# Patient Record
Sex: FEMALE | Race: WHITE | NOT HISPANIC OR LATINO | Employment: FULL TIME | URBAN - NONMETROPOLITAN AREA
[De-identification: names, ages, dates, MRNs, and addresses within clinical notes are randomized per-mention and may not be internally consistent; named-entity substitution may affect disease eponyms.]

---

## 2024-04-17 ENCOUNTER — APPOINTMENT (OUTPATIENT)
Dept: CT IMAGING | Facility: HOSPITAL | Age: 59
End: 2024-04-17
Payer: COMMERCIAL

## 2024-04-17 ENCOUNTER — APPOINTMENT (OUTPATIENT)
Dept: GENERAL RADIOLOGY | Facility: HOSPITAL | Age: 59
End: 2024-04-17
Payer: COMMERCIAL

## 2024-04-17 ENCOUNTER — HOSPITAL ENCOUNTER (OUTPATIENT)
Facility: HOSPITAL | Age: 59
Setting detail: OBSERVATION
Discharge: HOME | End: 2024-04-18
Attending: EMERGENCY MEDICINE | Admitting: STUDENT IN AN ORGANIZED HEALTH CARE EDUCATION/TRAINING PROGRAM
Payer: COMMERCIAL

## 2024-04-17 DIAGNOSIS — R07.9 CHEST PAIN, UNSPECIFIED TYPE: Primary | ICD-10-CM

## 2024-04-17 DIAGNOSIS — J15.69 PNEUMONIA OF LEFT LOWER LOBE DUE TO OTHER AEROBIC GRAM-NEGATIVE BACTERIA: ICD-10-CM

## 2024-04-17 DIAGNOSIS — A41.9 SEPSIS, DUE TO UNSPECIFIED ORGANISM, UNSPECIFIED WHETHER ACUTE ORGAN DYSFUNCTION PRESENT: ICD-10-CM

## 2024-04-17 PROBLEM — R65.10 SIRS (SYSTEMIC INFLAMMATORY RESPONSE SYNDROME): Status: ACTIVE | Noted: 2024-04-17

## 2024-04-17 LAB
ALBUMIN SERPL-MCNC: 3.9 G/DL (ref 3.5–5.2)
ALBUMIN/GLOB SERPL: 1.4 G/DL
ALP SERPL-CCNC: 70 U/L (ref 39–117)
ALT SERPL W P-5'-P-CCNC: 10 U/L (ref 1–33)
ANION GAP SERPL CALCULATED.3IONS-SCNC: 13.2 MMOL/L (ref 5–15)
AST SERPL-CCNC: 17 U/L (ref 1–32)
B PARAPERT DNA SPEC QL NAA+PROBE: NOT DETECTED
B PERT DNA SPEC QL NAA+PROBE: NOT DETECTED
BACTERIA UR QL AUTO: ABNORMAL /HPF
BASOPHILS # BLD AUTO: 0.1 10*3/MM3 (ref 0–0.2)
BASOPHILS NFR BLD AUTO: 0.3 % (ref 0–1.5)
BILIRUB SERPL-MCNC: 1.1 MG/DL (ref 0–1.2)
BILIRUB UR QL STRIP: ABNORMAL
BUN SERPL-MCNC: 15 MG/DL (ref 8–23)
BUN/CREAT SERPL: 21.7 (ref 7–25)
C PNEUM DNA NPH QL NAA+NON-PROBE: NOT DETECTED
CALCIUM SPEC-SCNC: 9.6 MG/DL (ref 8.6–10.5)
CHLORIDE SERPL-SCNC: 89 MMOL/L (ref 98–107)
CLARITY UR: ABNORMAL
CO2 SERPL-SCNC: 27.8 MMOL/L (ref 22–29)
COLOR UR: ABNORMAL
CREAT SERPL-MCNC: 0.69 MG/DL (ref 0.57–1)
CRP SERPL-MCNC: 34.68 MG/DL (ref 0–0.5)
D-LACTATE SERPL-SCNC: 1.6 MMOL/L (ref 0.5–2)
DEPRECATED RDW RBC AUTO: 41.7 FL (ref 37–54)
EGFRCR SERPLBLD CKD-EPI 2021: 98.9 ML/MIN/1.73
EOSINOPHIL # BLD AUTO: 0.01 10*3/MM3 (ref 0–0.4)
EOSINOPHIL NFR BLD AUTO: 0 % (ref 0.3–6.2)
ERYTHROCYTE [DISTWIDTH] IN BLOOD BY AUTOMATED COUNT: 12.7 % (ref 12.3–15.4)
ERYTHROCYTE [SEDIMENTATION RATE] IN BLOOD: 39 MM/HR (ref 0–30)
FLUAV SUBTYP SPEC NAA+PROBE: NOT DETECTED
FLUBV RNA ISLT QL NAA+PROBE: NOT DETECTED
GEN 5 2HR TROPONIN T REFLEX: 15 NG/L
GLOBULIN UR ELPH-MCNC: 2.7 GM/DL
GLUCOSE SERPL-MCNC: 107 MG/DL (ref 65–99)
GLUCOSE UR STRIP-MCNC: NEGATIVE MG/DL
HADV DNA SPEC NAA+PROBE: NOT DETECTED
HCOV 229E RNA SPEC QL NAA+PROBE: NOT DETECTED
HCOV HKU1 RNA SPEC QL NAA+PROBE: NOT DETECTED
HCOV NL63 RNA SPEC QL NAA+PROBE: NOT DETECTED
HCOV OC43 RNA SPEC QL NAA+PROBE: NOT DETECTED
HCT VFR BLD AUTO: 42.1 % (ref 34–46.6)
HGB BLD-MCNC: 13.8 G/DL (ref 12–15.9)
HGB UR QL STRIP.AUTO: NEGATIVE
HMPV RNA NPH QL NAA+NON-PROBE: DETECTED
HOLD SPECIMEN: NORMAL
HOLD SPECIMEN: NORMAL
HPIV1 RNA ISLT QL NAA+PROBE: NOT DETECTED
HPIV2 RNA SPEC QL NAA+PROBE: NOT DETECTED
HPIV3 RNA NPH QL NAA+PROBE: NOT DETECTED
HPIV4 P GENE NPH QL NAA+PROBE: NOT DETECTED
HYALINE CASTS UR QL AUTO: ABNORMAL /LPF
IMM GRANULOCYTES # BLD AUTO: 0.21 10*3/MM3 (ref 0–0.05)
IMM GRANULOCYTES NFR BLD AUTO: 0.7 % (ref 0–0.5)
KETONES UR QL STRIP: ABNORMAL
LEUKOCYTE ESTERASE UR QL STRIP.AUTO: ABNORMAL
LYMPHOCYTES # BLD AUTO: 1.74 10*3/MM3 (ref 0.7–3.1)
LYMPHOCYTES NFR BLD AUTO: 5.9 % (ref 19.6–45.3)
M PNEUMO IGG SER IA-ACNC: NOT DETECTED
M PNEUMO IGM SER QL: NEGATIVE
MAGNESIUM SERPL-MCNC: 1.9 MG/DL (ref 1.6–2.4)
MCH RBC QN AUTO: 29.7 PG (ref 26.6–33)
MCHC RBC AUTO-ENTMCNC: 32.8 G/DL (ref 31.5–35.7)
MCV RBC AUTO: 90.7 FL (ref 79–97)
MONOCYTES # BLD AUTO: 1.1 10*3/MM3 (ref 0.1–0.9)
MONOCYTES NFR BLD AUTO: 3.8 % (ref 5–12)
NEUTROPHILS NFR BLD AUTO: 26.12 10*3/MM3 (ref 1.7–7)
NEUTROPHILS NFR BLD AUTO: 89.3 % (ref 42.7–76)
NITRITE UR QL STRIP: NEGATIVE
NRBC BLD AUTO-RTO: 0 /100 WBC (ref 0–0.2)
PH UR STRIP.AUTO: 6 [PH] (ref 5–8)
PLATELET # BLD AUTO: 201 10*3/MM3 (ref 140–450)
PMV BLD AUTO: 9.8 FL (ref 6–12)
POTASSIUM SERPL-SCNC: 2.7 MMOL/L (ref 3.5–5.2)
PROT SERPL-MCNC: 6.6 G/DL (ref 6–8.5)
PROT UR QL STRIP: ABNORMAL
QT INTERVAL: 364 MS
QTC INTERVAL: 450 MS
RBC # BLD AUTO: 4.64 10*6/MM3 (ref 3.77–5.28)
RBC # UR STRIP: ABNORMAL /HPF
REF LAB TEST METHOD: ABNORMAL
RHINOVIRUS RNA SPEC NAA+PROBE: NOT DETECTED
RSV RNA NPH QL NAA+NON-PROBE: NOT DETECTED
SARS-COV-2 RNA NPH QL NAA+NON-PROBE: NOT DETECTED
SODIUM SERPL-SCNC: 130 MMOL/L (ref 136–145)
SP GR UR STRIP: 1.02 (ref 1–1.03)
SQUAMOUS #/AREA URNS HPF: ABNORMAL /HPF
TROPONIN T DELTA: -4 NG/L
TROPONIN T SERPL HS-MCNC: 19 NG/L
TSH SERPL DL<=0.05 MIU/L-ACNC: 3.32 UIU/ML (ref 0.27–4.2)
UROBILINOGEN UR QL STRIP: ABNORMAL
WBC # UR STRIP: ABNORMAL /HPF
WBC NRBC COR # BLD AUTO: 29.28 10*3/MM3 (ref 3.4–10.8)
WHOLE BLOOD HOLD COAG: NORMAL
WHOLE BLOOD HOLD SPECIMEN: NORMAL

## 2024-04-17 PROCEDURE — 84484 ASSAY OF TROPONIN QUANT: CPT | Performed by: PHYSICIAN ASSISTANT

## 2024-04-17 PROCEDURE — G0378 HOSPITAL OBSERVATION PER HR: HCPCS

## 2024-04-17 PROCEDURE — 96365 THER/PROPH/DIAG IV INF INIT: CPT

## 2024-04-17 PROCEDURE — 87086 URINE CULTURE/COLONY COUNT: CPT | Performed by: PHYSICIAN ASSISTANT

## 2024-04-17 PROCEDURE — 86140 C-REACTIVE PROTEIN: CPT | Performed by: PHYSICIAN ASSISTANT

## 2024-04-17 PROCEDURE — 71045 X-RAY EXAM CHEST 1 VIEW: CPT

## 2024-04-17 PROCEDURE — 93010 ELECTROCARDIOGRAM REPORT: CPT | Performed by: INTERNAL MEDICINE

## 2024-04-17 PROCEDURE — 74177 CT ABD & PELVIS W/CONTRAST: CPT

## 2024-04-17 PROCEDURE — 94664 DEMO&/EVAL PT USE INHALER: CPT

## 2024-04-17 PROCEDURE — 36415 COLL VENOUS BLD VENIPUNCTURE: CPT

## 2024-04-17 PROCEDURE — 25010000002 AZTREONAM PER 500 MG: Performed by: PHYSICIAN ASSISTANT

## 2024-04-17 PROCEDURE — 71275 CT ANGIOGRAPHY CHEST: CPT

## 2024-04-17 PROCEDURE — 93005 ELECTROCARDIOGRAM TRACING: CPT | Performed by: PHYSICIAN ASSISTANT

## 2024-04-17 PROCEDURE — 83735 ASSAY OF MAGNESIUM: CPT | Performed by: PHYSICIAN ASSISTANT

## 2024-04-17 PROCEDURE — 85652 RBC SED RATE AUTOMATED: CPT | Performed by: PHYSICIAN ASSISTANT

## 2024-04-17 PROCEDURE — 70450 CT HEAD/BRAIN W/O DYE: CPT | Performed by: RADIOLOGY

## 2024-04-17 PROCEDURE — 80053 COMPREHEN METABOLIC PANEL: CPT | Performed by: PHYSICIAN ASSISTANT

## 2024-04-17 PROCEDURE — 25510000001 IOPAMIDOL PER 1 ML: Performed by: EMERGENCY MEDICINE

## 2024-04-17 PROCEDURE — 25010000002 ENOXAPARIN PER 10 MG: Performed by: STUDENT IN AN ORGANIZED HEALTH CARE EDUCATION/TRAINING PROGRAM

## 2024-04-17 PROCEDURE — 85025 COMPLETE CBC W/AUTO DIFF WBC: CPT | Performed by: PHYSICIAN ASSISTANT

## 2024-04-17 PROCEDURE — 96361 HYDRATE IV INFUSION ADD-ON: CPT

## 2024-04-17 PROCEDURE — 87040 BLOOD CULTURE FOR BACTERIA: CPT | Performed by: PHYSICIAN ASSISTANT

## 2024-04-17 PROCEDURE — 25810000003 LACTATED RINGERS PER 1000 ML: Performed by: STUDENT IN AN ORGANIZED HEALTH CARE EDUCATION/TRAINING PROGRAM

## 2024-04-17 PROCEDURE — 74177 CT ABD & PELVIS W/CONTRAST: CPT | Performed by: RADIOLOGY

## 2024-04-17 PROCEDURE — 86738 MYCOPLASMA ANTIBODY: CPT | Performed by: STUDENT IN AN ORGANIZED HEALTH CARE EDUCATION/TRAINING PROGRAM

## 2024-04-17 PROCEDURE — 99223 1ST HOSP IP/OBS HIGH 75: CPT

## 2024-04-17 PROCEDURE — 81001 URINALYSIS AUTO W/SCOPE: CPT | Performed by: PHYSICIAN ASSISTANT

## 2024-04-17 PROCEDURE — 96367 TX/PROPH/DG ADDL SEQ IV INF: CPT

## 2024-04-17 PROCEDURE — 83605 ASSAY OF LACTIC ACID: CPT | Performed by: PHYSICIAN ASSISTANT

## 2024-04-17 PROCEDURE — 99285 EMERGENCY DEPT VISIT HI MDM: CPT

## 2024-04-17 PROCEDURE — 25010000002 VANCOMYCIN 5 G RECONSTITUTED SOLUTION: Performed by: PHYSICIAN ASSISTANT

## 2024-04-17 PROCEDURE — 84443 ASSAY THYROID STIM HORMONE: CPT | Performed by: PHYSICIAN ASSISTANT

## 2024-04-17 PROCEDURE — 94799 UNLISTED PULMONARY SVC/PX: CPT

## 2024-04-17 PROCEDURE — 96372 THER/PROPH/DIAG INJ SC/IM: CPT

## 2024-04-17 PROCEDURE — 0202U NFCT DS 22 TRGT SARS-COV-2: CPT | Performed by: STUDENT IN AN ORGANIZED HEALTH CARE EDUCATION/TRAINING PROGRAM

## 2024-04-17 PROCEDURE — 71045 X-RAY EXAM CHEST 1 VIEW: CPT | Performed by: RADIOLOGY

## 2024-04-17 PROCEDURE — 71275 CT ANGIOGRAPHY CHEST: CPT | Performed by: RADIOLOGY

## 2024-04-17 PROCEDURE — 70450 CT HEAD/BRAIN W/O DYE: CPT

## 2024-04-17 PROCEDURE — 25810000003 SODIUM CHLORIDE 0.9 % SOLUTION: Performed by: PHYSICIAN ASSISTANT

## 2024-04-17 PROCEDURE — 94761 N-INVAS EAR/PLS OXIMETRY MLT: CPT

## 2024-04-17 RX ORDER — SODIUM CHLORIDE 0.9 % (FLUSH) 0.9 %
10 SYRINGE (ML) INJECTION AS NEEDED
Status: DISCONTINUED | OUTPATIENT
Start: 2024-04-17 | End: 2024-04-18 | Stop reason: HOSPADM

## 2024-04-17 RX ORDER — SODIUM CHLORIDE, SODIUM LACTATE, POTASSIUM CHLORIDE, CALCIUM CHLORIDE 600; 310; 30; 20 MG/100ML; MG/100ML; MG/100ML; MG/100ML
100 INJECTION, SOLUTION INTRAVENOUS CONTINUOUS
Status: DISCONTINUED | OUTPATIENT
Start: 2024-04-17 | End: 2024-04-18 | Stop reason: HOSPADM

## 2024-04-17 RX ORDER — IPRATROPIUM BROMIDE AND ALBUTEROL SULFATE 2.5; .5 MG/3ML; MG/3ML
3 SOLUTION RESPIRATORY (INHALATION) EVERY 6 HOURS PRN
Status: DISCONTINUED | OUTPATIENT
Start: 2024-04-17 | End: 2024-04-18 | Stop reason: HOSPADM

## 2024-04-17 RX ORDER — ENOXAPARIN SODIUM 100 MG/ML
40 INJECTION SUBCUTANEOUS DAILY
Status: DISCONTINUED | OUTPATIENT
Start: 2024-04-17 | End: 2024-04-18 | Stop reason: HOSPADM

## 2024-04-17 RX ORDER — DOXYCYCLINE 100 MG/1
100 CAPSULE ORAL DAILY
Status: CANCELLED | OUTPATIENT
Start: 2024-04-17

## 2024-04-17 RX ORDER — LEVOTHYROXINE SODIUM 0.1 MG/1
100 TABLET ORAL
COMMUNITY

## 2024-04-17 RX ORDER — SODIUM CHLORIDE 0.9 % (FLUSH) 0.9 %
10 SYRINGE (ML) INJECTION EVERY 12 HOURS SCHEDULED
Status: DISCONTINUED | OUTPATIENT
Start: 2024-04-17 | End: 2024-04-18 | Stop reason: HOSPADM

## 2024-04-17 RX ORDER — HYDROCHLOROTHIAZIDE 25 MG/1
25 TABLET ORAL EVERY MORNING
Status: CANCELLED | OUTPATIENT
Start: 2024-04-18

## 2024-04-17 RX ORDER — DOXYCYCLINE HYCLATE 100 MG/1
100 CAPSULE ORAL 2 TIMES DAILY
COMMUNITY
End: 2024-04-18 | Stop reason: HOSPADM

## 2024-04-17 RX ORDER — ALBUTEROL SULFATE 90 UG/1
2 AEROSOL, METERED RESPIRATORY (INHALATION) 2 TIMES DAILY PRN
COMMUNITY

## 2024-04-17 RX ORDER — LOSARTAN POTASSIUM 25 MG/1
12.5 TABLET ORAL DAILY
Status: CANCELLED | OUTPATIENT
Start: 2024-04-18

## 2024-04-17 RX ORDER — HYDROCHLOROTHIAZIDE 25 MG/1
25 TABLET ORAL EVERY MORNING
COMMUNITY
End: 2024-04-18 | Stop reason: HOSPADM

## 2024-04-17 RX ORDER — VANCOMYCIN/0.9 % SOD CHLORIDE 1.5G/250ML
20 PLASTIC BAG, INJECTION (ML) INTRAVENOUS ONCE
Status: COMPLETED | OUTPATIENT
Start: 2024-04-17 | End: 2024-04-17

## 2024-04-17 RX ORDER — POTASSIUM CHLORIDE 20 MEQ/1
40 TABLET, EXTENDED RELEASE ORAL EVERY 4 HOURS
Status: COMPLETED | OUTPATIENT
Start: 2024-04-17 | End: 2024-04-18

## 2024-04-17 RX ORDER — SODIUM CHLORIDE 9 MG/ML
40 INJECTION, SOLUTION INTRAVENOUS AS NEEDED
Status: DISCONTINUED | OUTPATIENT
Start: 2024-04-17 | End: 2024-04-18 | Stop reason: HOSPADM

## 2024-04-17 RX ORDER — POTASSIUM CHLORIDE 20 MEQ/1
40 TABLET, EXTENDED RELEASE ORAL ONCE
Status: COMPLETED | OUTPATIENT
Start: 2024-04-17 | End: 2024-04-17

## 2024-04-17 RX ORDER — ASPIRIN 81 MG/1
324 TABLET, CHEWABLE ORAL ONCE
Status: COMPLETED | OUTPATIENT
Start: 2024-04-17 | End: 2024-04-17

## 2024-04-17 RX ORDER — LOSARTAN POTASSIUM 25 MG/1
12.5 TABLET ORAL DAILY
COMMUNITY
End: 2024-04-18 | Stop reason: HOSPADM

## 2024-04-17 RX ADMIN — VANCOMYCIN HYDROCHLORIDE 1500 MG: 5 INJECTION, POWDER, LYOPHILIZED, FOR SOLUTION INTRAVENOUS at 15:11

## 2024-04-17 RX ADMIN — Medication 10 ML: at 20:28

## 2024-04-17 RX ADMIN — ENOXAPARIN SODIUM 40 MG: 40 INJECTION SUBCUTANEOUS at 20:27

## 2024-04-17 RX ADMIN — POTASSIUM CHLORIDE 40 MEQ: 1500 TABLET, EXTENDED RELEASE ORAL at 15:11

## 2024-04-17 RX ADMIN — POTASSIUM CHLORIDE 40 MEQ: 1500 TABLET, EXTENDED RELEASE ORAL at 22:37

## 2024-04-17 RX ADMIN — ASPIRIN 324 MG: 81 TABLET, CHEWABLE ORAL at 12:57

## 2024-04-17 RX ADMIN — AZTREONAM 2 G: 2 INJECTION, POWDER, LYOPHILIZED, FOR SOLUTION INTRAMUSCULAR; INTRAVENOUS at 14:09

## 2024-04-17 RX ADMIN — IOPAMIDOL 100 ML: 755 INJECTION, SOLUTION INTRAVENOUS at 16:44

## 2024-04-17 RX ADMIN — SODIUM CHLORIDE, POTASSIUM CHLORIDE, SODIUM LACTATE AND CALCIUM CHLORIDE 100 ML/HR: 600; 310; 30; 20 INJECTION, SOLUTION INTRAVENOUS at 20:28

## 2024-04-17 NOTE — ED PROVIDER NOTES
Subjective   History of Present Illness  59-year-old female presents secondary to chest pain along with feeling poorly.  Patient states that she has felt poorly on and off since December.  She states that she has been on a few rounds of antibiotics.  She states that she is currently on doxycycline.  She states that she continues to have fevers subjectively on and off with sweats.  She states that she was on some steroids though she finished these last week  She states that in the evening she had an episode of left-sided chest pain that felt like tightness that lasted several hours before it finally resolved spontaneously.  No relieving or exacerbating factors.  No pain with breathing or movement.  No productive cough however she states she has had a loose cough.  She states that she has had sweats at times.  She states that yesterday she was extremely fatigued and slept most of the day.  She has a past medical history of hypertension and thyroid disease.  She presents by private vehicle with .      Review of Systems   Constitutional:  Positive for fatigue and fever.   HENT: Negative.     Respiratory:  Positive for cough.    Cardiovascular: Negative.  Negative for chest pain.   Gastrointestinal: Negative.  Negative for abdominal pain.   Endocrine: Negative.    Genitourinary: Negative.  Negative for dysuria.   Skin: Negative.    Neurological: Negative.    Psychiatric/Behavioral: Negative.     All other systems reviewed and are negative.      History reviewed. No pertinent past medical history.    Allergies   Allergen Reactions    Bactrim [Sulfamethoxazole-Trimethoprim] Hives    Clindamycin/Lincomycin Hives    Penicillins Hives       History reviewed. No pertinent surgical history.    History reviewed. No pertinent family history.    Social History     Socioeconomic History    Marital status:    Tobacco Use    Smoking status: Never    Smokeless tobacco: Never   Vaping Use    Vaping status: Never Used    Substance and Sexual Activity    Alcohol use: Never    Drug use: Never    Sexual activity: Defer           Objective   Physical Exam  Vitals and nursing note reviewed.   Constitutional:       General: She is not in acute distress.     Appearance: She is well-developed. She is not diaphoretic.   HENT:      Head: Normocephalic and atraumatic.      Right Ear: External ear normal.      Left Ear: External ear normal.      Nose: Nose normal.   Eyes:      Conjunctiva/sclera: Conjunctivae normal.      Pupils: Pupils are equal, round, and reactive to light.   Neck:      Vascular: No JVD.      Trachea: No tracheal deviation.   Cardiovascular:      Rate and Rhythm: Normal rate and regular rhythm.      Heart sounds: Normal heart sounds. No murmur heard.  Pulmonary:      Effort: Pulmonary effort is normal. No respiratory distress.      Breath sounds: Normal breath sounds. No wheezing.   Abdominal:      General: Bowel sounds are normal.      Palpations: Abdomen is soft.      Tenderness: There is no abdominal tenderness.   Musculoskeletal:         General: No deformity. Normal range of motion.      Cervical back: Normal range of motion and neck supple.   Skin:     General: Skin is warm and dry.      Coloration: Skin is not pale.      Findings: No erythema or rash.   Neurological:      Mental Status: She is alert and oriented to person, place, and time.      Cranial Nerves: No cranial nerve deficit.   Psychiatric:         Behavior: Behavior normal.         Thought Content: Thought content normal.         Procedures           ED Course  ED Course as of 04/17/24 1817 Wed Apr 17, 2024   1434 ECG 12 Lead Chest Pain  Vent. Rate :  92 BPM     Atrial Rate : 288 BPM     P-R Int :   * ms          QRS Dur :  92 ms      QT Int : 364 ms       P-R-T Axes :   *  59  78 degrees     QTc Int : 450 ms     Atrial flutter with variable AV block  Possible Anterior infarct , age undetermined  Abnormal ECG  No previous ECGs available   [ES]   6124  Reached out to the hospitalist service. [JI]      ED Course User Index  [ES] Danny Heath MD  [JI] Volodymyr Amaro PA                HEART Score: 4                              Medical Decision Making  59-year-old female presents secondary to chest pain along with feeling poorly.  Patient states that she has felt poorly on and off since December.  She states that she has been on a few rounds of antibiotics.  She states that she is currently on doxycycline.  She states that she continues to have fevers subjectively on and off with sweats.  She states that she was on some steroids though she finished these last week  She states that in the evening she had an episode of left-sided chest pain that felt like tightness that lasted several hours before it finally resolved spontaneously.  No relieving or exacerbating factors.  No pain with breathing or movement.  No productive cough however she states she has had a loose cough.  She states that she has had sweats at times.  She states that yesterday she was extremely fatigued and slept most of the day.  She has a past medical history of hypertension and thyroid disease.  She presents by private vehicle with .    Problems Addressed:  Chest pain, unspecified type: complicated acute illness or injury  Sepsis, due to unspecified organism, unspecified whether acute organ dysfunction present: complicated acute illness or injury    Amount and/or Complexity of Data Reviewed  Labs: ordered. Decision-making details documented in ED Course.  Radiology: ordered. Decision-making details documented in ED Course.  ECG/medicine tests: ordered. Decision-making details documented in ED Course.  Discussion of management or test interpretation with external provider(s): Dr Ori Cerrato  OTC drugs.  Prescription drug management.  Decision regarding hospitalization.  Risk Details: Counseled patient about her diagnostic room labs.  She is counseled at the signs and symptoms  worsening appropriate follow-up.  She voices understanding.        Final diagnoses:   Chest pain, unspecified type   Sepsis, due to unspecified organism, unspecified whether acute organ dysfunction present       ED Disposition  ED Disposition       ED Disposition   Decision to Admit    Condition   --    Comment   Level of Care: Med/Surg [1]   Diagnosis: SIRS (systemic inflammatory response syndrome) [773156]                 No follow-up provider specified.       Medication List      No changes were made to your prescriptions during this visit.            Volodymyr Amaro PA  04/17/24 9048

## 2024-04-17 NOTE — H&P
"    [facility] Medicine Services  History & Physical    Patient Identification:  Name:  Iqra López  Age:  59 y.o.  Sex:  female  :  1965  MRN:  [id number]   Visit Number:  [id number]  Admit Date: 2024   Primary Care Physician:  Kitty Kidd APRN    Subjective     Chief complaint: dizziness    History of presenting illness:      Iqra López is a 59 y.o. female who presented for further evaluation of dizziness/pre-syncope, chest pain. She was seen and examined in the ED with spouse at the bedside. They report patient with sudden onset chest pain with felt like a retrosternal pressure 7 days prior to presentation to the ED. She reports her stomach was upset at the time, she states \"probably just gas.\" She also felt dizzy, like she may pass out. She refused to seek medical care at that time. Unfortunately she has not felt well since that time with significant fatigue persisting since that time, stating she just wants to sleep all day. She denies any chest pain since initial episode. No palpitations. She reports subjective fevers throughout the past week as well. She has a cough but states seems to have persisted \"all winter.\" She has been treated with antibiotics and steroids several times. She just finished prednisone course a few days ago and was still finishing antibiotics. She denies previous diagnosis of COPD but does have 50+ year smoking history. Current cough is described as loose but nonproductive. She does report sick contacts at work with a \"stomach bug.\" Beyond her bloating she denies abdominal pain, diarrhea, nausea or vomiting. She denies any wounds or rashes. She denies dysuria but reports decreased urine output. On further discussion she admits her appetite has been diminished as food is not appealing/doesn't taste right.  Of note patient did report frequent night sweats to ED provider as well.    Past medical history is significant for tobacco " abuse    Upon arrival to the ED, vital signs were temp 97.9, heart rate 107, respirations 17, /51, SpO2 95% on RA.  HS troponin elevated on arrival at 19 with repeat at 15.  Sodium is 130 with potassium 2.7.  CRP was 34.68 with WBC count 29.28 and neutrophil percentage 89.3.  Urine was abnormal with moderate leukocytes, 11-20 WBCs and 1+ bacteria though did note 3-6 squamous epis.  Imaging workup noted consolidative lobar pneumonia in the left lower lobe with other multifocal patchy nodularities and groundglass opacities compatible with multifocal infection scattered throughout the lungs bilaterally.  CT of the head was unremarkable.    Known Emergency Department medications received prior to my evaluation included full dose aspirin, aztreonam, potassium chloride, vancomycin.   Emergency Department Room location at the time of my evaluation was 401.     ---------------------------------------------------------------------------------------------------------------------   Review of Systems   Constitutional:  Positive for activity change, appetite change, chills, diaphoresis and fever.   HENT:  Negative for congestion and rhinorrhea.    Respiratory:  Positive for cough. Negative for shortness of breath.    Cardiovascular:  Positive for chest pain. Negative for palpitations.   Gastrointestinal:  Positive for abdominal distention and abdominal pain. Negative for diarrhea, nausea and vomiting.   Genitourinary:  Negative for difficulty urinating and dysuria.   Musculoskeletal:  Negative for arthralgias and myalgias.   Skin:  Negative for rash and wound.   Neurological:  Positive for dizziness and light-headedness. Negative for syncope.        ---------------------------------------------------------------------------------------------------------------------   No past medical history on file.  No past surgical history on file.  No family history on file.  Social History     Socioeconomic History    Marital status:       ---------------------------------------------------------------------------------------------------------------------   Allergies:  Bactrim [sulfamethoxazole-trimethoprim], Clindamycin/lincomycin, and Penicillins  ---------------------------------------------------------------------------------------------------------------------   Home medications:    Medications below are reported home medications pulling from within the system; at this time, these medications have not been reconciled unless otherwise specified and are in the verification process for further verifcation as current home medications.  (Not in a hospital admission)      Hospital Scheduled Meds:          Current listed hospital scheduled medications may not yet reflect those currently placed in orders that are signed and held awaiting patient's arrival to floor.   ---------------------------------------------------------------------------------------------------------------------     Objective     Vital Signs:  Temp:  [97.9 °F (36.6 °C)] 97.9 °F (36.6 °C)  Heart Rate:  [] 93  Resp:  [17] 17  BP: ()/(51-78) 110/68      04/17/24  1221   Weight: 72.1 kg (159 lb)     Body mass index is 26.46 kg/m².  ---------------------------------------------------------------------------------------------------------------------       Physical Exam  Vitals and nursing note reviewed.   Constitutional:       General: She is not in acute distress.  HENT:      Head: Normocephalic and atraumatic.   Eyes:      Extraocular Movements: Extraocular movements intact.      Conjunctiva/sclera: Conjunctivae normal.   Cardiovascular:      Rate and Rhythm: Normal rate and regular rhythm.   Pulmonary:      Effort: Pulmonary effort is normal.      Breath sounds: Rhonchi present.   Abdominal:      Palpations: Abdomen is soft.      Tenderness: There is no abdominal tenderness.   Musculoskeletal:      Right lower leg: No edema.      Left lower leg: No edema.  "  Skin:     General: Skin is warm and dry.   Neurological:      Mental Status: She is alert. Mental status is at baseline.   Psychiatric:         Mood and Affect: Mood normal.         Behavior: Behavior normal.             ---------------------------------------------------------------------------------------------------------------------  EKG:        I have personally looked at the EKG.  ---------------------------------------------------------------------------------------------------------------------   Results from last 7 days   Lab Units 04/17/24  1336 04/17/24  1255   CRP mg/dL  --  34.68*   LACTATE mmol/L 1.6  --    WBC 10*3/mm3  --  29.28*   HEMOGLOBIN g/dL  --  13.8   HEMATOCRIT %  --  42.1   MCV fL  --  90.7   MCHC g/dL  --  32.8   PLATELETS 10*3/mm3  --  201         Results from last 7 days   Lab Units 04/17/24  1344   SODIUM mmol/L 130*   POTASSIUM mmol/L 2.7*   MAGNESIUM mg/dL 1.9   CHLORIDE mmol/L 89*   CO2 mmol/L 27.8   BUN mg/dL 15   CREATININE mg/dL 0.69   CALCIUM mg/dL 9.6   GLUCOSE mg/dL 107*   ALBUMIN g/dL 3.9   BILIRUBIN mg/dL 1.1   ALK PHOS U/L 70   AST (SGOT) U/L 17   ALT (SGPT) U/L 10   Estimated Creatinine Clearance: 85.2 mL/min (by C-G formula based on SCr of 0.69 mg/dL).  No results found for: \"AMMONIA\"  Results from last 7 days   Lab Units 04/17/24  1344   HSTROP T ng/L 19*         No results found for: \"HGBA1C\"  Lab Results   Component Value Date    TSH 3.320 04/17/2024     No results found for: \"PREGTESTUR\", \"PREGSERUM\", \"HCG\", \"HCGQUANT\"  Pain Management Panel           No data to display              No results found for: \"BLOODCX\"  No results found for: \"URINECX\"  No results found for: \"WOUNDCX\"  No results found for: \"STOOLCX\"      ---------------------------------------------------------------------------------------------------------------------  Imaging Results (Last 7 Days)       Procedure Component Value Units Date/Time    CT Abdomen Pelvis With Contrast [[id number]] Resulted: " "04/17/24 1625     Updated: 04/17/24 1625    CT Angiogram Chest Pulmonary Embolism [[id number]] Resulted: 04/17/24 1624     Updated: 04/17/24 1624    XR Chest 1 View [[id number]] Collected: 04/17/24 1312     Updated: 04/17/24 1327    Narrative:      PROCEDURE: XR CHEST 1 VW-       HISTORY: Chest Pain Protocol     COMPARISON: None.     FINDINGS: The heart is normal in size. The mediastinum is unremarkable.  There is blunting of the left costophrenic angle likely a small  effusion. There is left basilar atelectasis. The right lung is clear.  There is no pneumothorax. There are no acute osseous abnormalities.       Impression:      Small left effusion and basilar atelectasis.        This report was finalized on 4/17/2024 1:13 PM by Marques Echeverria M.D..       CT Head Without Contrast [[id number]] Collected: 04/17/24 1317     Updated: 04/17/24 1327    Narrative:      PROCEDURE: CT HEAD WO CONTRAST-     HISTORY: dizziness     COMPARISON:  None .     TECHNIQUE: Multiple axial CT images were performed from the foramen  magnum to the vertex without enhancement. This study was performed with  techniques to keep radiation doses as low as reasonably achievable  (ALARA). Individualized dose reduction techniques using automated  exposure control or adjustment of mA and/or kV according to the patient  size were employed.     FINDINGS: There is no CT evidence of hemorrhage. There is no mass, mass  effect or midline shift.  There is no hydrocephalus. The paranasal  sinuses are clear. Bone windows reveal no acute osseous abnormalities.       Impression:      No acute intracranial process.              This report was finalized on 4/17/2024 1:18 PM by Marques Echeverria M.D..               Cultures:  No results found for: \"BLOODCX\", \"URINECX\", \"WOUNDCX\", \"MRSACX\", \"RESPCX\", \"STOOLCX\"    Last echocardiogram:          I have personally reviewed the above radiology images and read the final radiology report on " 04/17/24  ---------------------------------------------------------------------------------------------------------------------  Assessment / Plan     There are no active hospital problems to display for this patient.      ASSESSMENT/PLAN:    Sepsis suspect secondary to acute bilateral multifocal pneumonia  Abnormal UA, concern for UTI contributing  Chest pain with indeterminately elevated HS troponin, possibly 2/2 #1  Hypokalemia  Chronic tobacco abuse, concern for underlying COPD  Patient presents secondary to 1 week history of fatigue and malaise with subjective fevers and night sweats following initial episode of chest pain/presyncope.  She also notes some abdominal pain/bloating without diarrhea, nausea or vomiting-notes several coworkers are sick with similar stomach bug.  She did meet SIRS/sepsis criteria on arrival with tachycardia and leukocytosis at 29 K.  CRP is elevated at 34.68.  Imaging revealed findings consistent with bilateral multifocal pneumonia with lobar consolidation in the left lower lobe  UA was abnormal with moderate leukocytes, 11-20 WBCs and 1+ bacteria though did note 3-6 squamous epis.  Culture is pending.  Admit to [facility] for further observation and management  Respiratory PCR to further evaluate  Continue aztreonam, pharmacy to dose  Repeat labs in the a.m.  Follow-up culture results  Continue to provide supportive care measures  Encourage smoking cessation    ----------  -DVT prophylaxis: Lovenox  -Activity: Up in chair  -Expected length of stay: Less than 2 midnights  -Disposition pending course    High risk secondary to SIRS/sepsis, likely due to multifocal pneumonia    There are no questions and answers to display.       Doc Marks PA-C   04/17/24  16:44 EDT

## 2024-04-17 NOTE — PLAN OF CARE
Goal Outcome Evaluation:  Plan of Care Reviewed With: patient        Progress: no change  Outcome Evaluation: pt admit from er. assessment and vitals obtained. no other changes to note at this time; poc ongoing

## 2024-04-17 NOTE — CASE MANAGEMENT/SOCIAL WORK
Discharge Planning Assessment   [place]     Patient Name: Iqra Rubio  MRN: [id number]  Today's Date: 4/17/2024    Admit Date: 4/17/2024    Plan: Spoke with patient and spouse at bedside. Patient has no POA or Living Will. Patient uses no DME, HH or Oxygen. Patient PCP Mena Aldana and pharmacy [facility]. Patient denies any needs at this time. Patient spouse will transport home at discharge.   Discharge Needs Assessment       Row Name 04/17/24 1451       Living Environment    People in Home spouse    Name(s) of People in Home VALENTIN RUBIO Spouse [phone]    Potentially Unsafe Housing Conditions none    In the past 12 months has the electric, gas, oil, or water company threatened to shut off services in your home? No    Primary Care Provided by self    Provides Primary Care For no one    Family Caregiver if Needed spouse    Quality of Family Relationships helpful;involved;supportive    Able to Return to Prior Arrangements yes       Resource/Environmental Concerns    Resource/Environmental Concerns none    Transportation Concerns none       Transportation Needs    In the past 12 months, has lack of transportation kept you from medical appointments or from getting medications? no    In the past 12 months, has lack of transportation kept you from meetings, work, or from getting things needed for daily living? No       Food Insecurity    Within the past 12 months, you worried that your food would run out before you got the money to buy more. Never true    Within the past 12 months, the food you bought just didn't last and you didn't have money to get more. Never true       Transition Planning    Patient/Family Anticipates Transition to home with family    Patient/Family Anticipated Services at Transition none    Transportation Anticipated family or friend will provide       Discharge Needs Assessment    Readmission Within the Last 30 Days no previous admission in last 30 days    Equipment Currently Used  at Home none    Concerns to be Addressed discharge planning    Anticipated Changes Related to Illness none    Equipment Needed After Discharge none                   Discharge Plan       Row Name 04/17/24 2651       Plan    Plan Spoke with patient and spouse at bedside. Patient has no POA or Living Will. Patient uses no DME, HH or Oxygen. Patient PCP Mena Aldana and pharmacy [facility] [place]. Patient denies any needs at this time. Patient spouse will transport home at discharge.    Patient/Family in Agreement with Plan yes               Anahy Ulloa

## 2024-04-17 NOTE — ED NOTES
Called [facility] to obtain a med list for the pt. They said they would fax it over at this time.

## 2024-04-18 VITALS
HEART RATE: 78 BPM | OXYGEN SATURATION: 94 % | HEIGHT: 65 IN | RESPIRATION RATE: 18 BRPM | WEIGHT: 162.7 LBS | SYSTOLIC BLOOD PRESSURE: 100 MMHG | BODY MASS INDEX: 27.11 KG/M2 | TEMPERATURE: 98.3 F | DIASTOLIC BLOOD PRESSURE: 58 MMHG

## 2024-04-18 PROBLEM — R65.10 SIRS (SYSTEMIC INFLAMMATORY RESPONSE SYNDROME): Status: RESOLVED | Noted: 2024-04-17 | Resolved: 2024-04-18

## 2024-04-18 LAB
ANION GAP SERPL CALCULATED.3IONS-SCNC: 8.2 MMOL/L (ref 5–15)
BACTERIA SPEC AEROBE CULT: NORMAL
BUN SERPL-MCNC: 12 MG/DL (ref 8–23)
BUN/CREAT SERPL: 25 (ref 7–25)
CALCIUM SPEC-SCNC: 9 MG/DL (ref 8.6–10.5)
CHLORIDE SERPL-SCNC: 98 MMOL/L (ref 98–107)
CO2 SERPL-SCNC: 27.8 MMOL/L (ref 22–29)
CREAT SERPL-MCNC: 0.48 MG/DL (ref 0.57–1)
DEPRECATED RDW RBC AUTO: 43.3 FL (ref 37–54)
EGFRCR SERPLBLD CKD-EPI 2021: 107.9 ML/MIN/1.73
ERYTHROCYTE [DISTWIDTH] IN BLOOD BY AUTOMATED COUNT: 12.5 % (ref 12.3–15.4)
GLUCOSE SERPL-MCNC: 110 MG/DL (ref 65–99)
HCT VFR BLD AUTO: 37.3 % (ref 34–46.6)
HGB BLD-MCNC: 11.8 G/DL (ref 12–15.9)
MCH RBC QN AUTO: 29.7 PG (ref 26.6–33)
MCHC RBC AUTO-ENTMCNC: 31.6 G/DL (ref 31.5–35.7)
MCV RBC AUTO: 94 FL (ref 79–97)
PLATELET # BLD AUTO: 177 10*3/MM3 (ref 140–450)
PMV BLD AUTO: 10.7 FL (ref 6–12)
POTASSIUM SERPL-SCNC: 3.1 MMOL/L (ref 3.5–5.2)
POTASSIUM SERPL-SCNC: 3.9 MMOL/L (ref 3.5–5.2)
PROCALCITONIN SERPL-MCNC: 0.89 NG/ML (ref 0–0.25)
RBC # BLD AUTO: 3.97 10*6/MM3 (ref 3.77–5.28)
SODIUM SERPL-SCNC: 134 MMOL/L (ref 136–145)
WBC NRBC COR # BLD AUTO: 18.83 10*3/MM3 (ref 3.4–10.8)

## 2024-04-18 PROCEDURE — G0378 HOSPITAL OBSERVATION PER HR: HCPCS

## 2024-04-18 PROCEDURE — 25810000003 LACTATED RINGERS PER 1000 ML: Performed by: STUDENT IN AN ORGANIZED HEALTH CARE EDUCATION/TRAINING PROGRAM

## 2024-04-18 PROCEDURE — 85027 COMPLETE CBC AUTOMATED: CPT | Performed by: STUDENT IN AN ORGANIZED HEALTH CARE EDUCATION/TRAINING PROGRAM

## 2024-04-18 PROCEDURE — 94799 UNLISTED PULMONARY SVC/PX: CPT

## 2024-04-18 PROCEDURE — 96361 HYDRATE IV INFUSION ADD-ON: CPT

## 2024-04-18 PROCEDURE — 84145 PROCALCITONIN (PCT): CPT | Performed by: STUDENT IN AN ORGANIZED HEALTH CARE EDUCATION/TRAINING PROGRAM

## 2024-04-18 PROCEDURE — 25010000002 ENOXAPARIN PER 10 MG: Performed by: STUDENT IN AN ORGANIZED HEALTH CARE EDUCATION/TRAINING PROGRAM

## 2024-04-18 PROCEDURE — 99239 HOSP IP/OBS DSCHRG MGMT >30: CPT

## 2024-04-18 PROCEDURE — 80048 BASIC METABOLIC PNL TOTAL CA: CPT | Performed by: STUDENT IN AN ORGANIZED HEALTH CARE EDUCATION/TRAINING PROGRAM

## 2024-04-18 PROCEDURE — 84132 ASSAY OF SERUM POTASSIUM: CPT | Performed by: STUDENT IN AN ORGANIZED HEALTH CARE EDUCATION/TRAINING PROGRAM

## 2024-04-18 PROCEDURE — 96372 THER/PROPH/DIAG INJ SC/IM: CPT

## 2024-04-18 RX ORDER — ALBUTEROL SULFATE 2.5 MG/3ML
2.5 SOLUTION RESPIRATORY (INHALATION) 2 TIMES DAILY PRN
Status: DISCONTINUED | OUTPATIENT
Start: 2024-04-18 | End: 2024-04-18 | Stop reason: HOSPADM

## 2024-04-18 RX ORDER — LEVOFLOXACIN 750 MG/1
750 TABLET, FILM COATED ORAL DAILY
Qty: 5 TABLET | Refills: 0 | Status: SHIPPED | OUTPATIENT
Start: 2024-04-18 | End: 2024-04-23

## 2024-04-18 RX ORDER — LEVOTHYROXINE SODIUM 0.1 MG/1
100 TABLET ORAL
Status: DISCONTINUED | OUTPATIENT
Start: 2024-04-18 | End: 2024-04-18 | Stop reason: HOSPADM

## 2024-04-18 RX ADMIN — POTASSIUM CHLORIDE 40 MEQ: 1500 TABLET, EXTENDED RELEASE ORAL at 01:34

## 2024-04-18 RX ADMIN — ENOXAPARIN SODIUM 40 MG: 40 INJECTION SUBCUTANEOUS at 08:36

## 2024-04-18 RX ADMIN — LEVOTHYROXINE SODIUM 100 MCG: 100 TABLET ORAL at 08:54

## 2024-04-18 RX ADMIN — POTASSIUM CHLORIDE 40 MEQ: 1500 TABLET, EXTENDED RELEASE ORAL at 05:37

## 2024-04-18 RX ADMIN — SODIUM CHLORIDE, POTASSIUM CHLORIDE, SODIUM LACTATE AND CALCIUM CHLORIDE 100 ML/HR: 600; 310; 30; 20 INJECTION, SOLUTION INTRAVENOUS at 05:37

## 2024-04-18 RX ADMIN — Medication 10 ML: at 08:36

## 2024-04-18 NOTE — PLAN OF CARE
Goal Outcome Evaluation:              Outcome Evaluation: Patient has rested in bed this shift, no complaints or request at this time, VSS, K replaced per protocol, Will continue plan of care.

## 2024-04-18 NOTE — DISCHARGE SUMMARY
"    [facility] HOSPITALIST DISCHARGE SUMMARY    Patient Identification:  Name:  Iqra López  Age:  59 y.o.  Sex:  female  :  1965  MRN:  [id number]  Visit Number:  [id number]    Date of Admission: 2024  Date of Discharge:  24     PCP: Kitty Kidd APRN    Discharging Provider: Michael Marks PA-C / Dr. Rehman    Discharge Diagnoses     Discharge Diagnoses:  Sepsis   Bilateral multifocal pneumonia, bacterial  Chest pain with indeterminately elevated HS troponin, suspect 2/2 #2  Human metapneumovirus   Significant tobacco abuse history, concern for underlying COPD not in acute exacerbation  Hypokalemia     Secondary Diagnoses:  Hypertension  hypothyroidism    Needs on follow up:  PCP, pulmonology for PFTs  Consults/Procedures     Consults:   Consults       No orders found for last 30 day(s).            Procedures/Scans Performed:  CXR  CT head wo  CT abdomen and pelvis w contrast  CT PE protocol        History of Presenting Illness     Chief Complaint   Patient presents with    Dizziness       Patient is a 59 y.o. female who presented to [facility] complaining of dizziness.  Please see the admitting history and physical for further details.      Hospital Course     Patient was admitted to [facility] following presentation to [facility] ED on 24 for further evaluation of fatigue, malaise, and dizziness. Symptoms had been present x 1 week and at initial onset she had experienced some chest discomfort  with reported pre-syncopal episode. She noted she has a 50+ year smoking history, never formally diagnosed with COPD, but had been sick \"all winter\" and treated with multiple rounds of doxycycline and steroids but felt that she had never fully returned to baseline. She was not short of breath during exam. She met SIRS/sepsis criteria on arrival to the ED with tachycardia and leukocytosis at 29K. CT of the head was unremarkable but CT PE protocol noted consolidative lobar " pneumonia LLL and multifocal patchy nodularities and ground glass opacities consistent with multifocal infection. Respiratory PCR was positive for human metapneumovirus. Also of note CMP found her to be hypokalemic at 2.7 and hyponatremic at 130. She was started on aztreonam and admitted for further observation.     Patient's lab work was repeated and demonstrated improvement overnight. She was seen and examined the following AM, stable on RA without increased work of breathing and reported she was feeling improved. Tachycardia resolved, no longer meeting sepsis criteria. Electrolytes were replaced per protocol and demonstrated improvement as well. Given patient clinically stable and not requiring supplemental O2 case was discussed with attending physician and agree she is stable for discharge home on this date. Antibiotics transitioned to Levaquin 750 mg QD x 5 days. Smoking history and concern for underlying COPD discussed and she was strongly encouraged not to smoke. It is also felt she will benefit from follow up with pulmonology for outpatient PFTs, referral placed at discharge. Given presentation with hypokalemia and hyponatremia and BP trend low normal during admission patient's antihypertensives have been held at discharge. She was encouraged to continue to monitor BP at home and keep a log. She will be scheduled to follow up with her PCP in 1 week. Recommend BP check and repeat BMP at that time to monitor electrolytes. The above discharge and follow up plan as well as medication changes were discussed with the patient and spouse at bedside and they expressed agreement and understanding.     Discharge Vitals/Physical Examination     Vital Signs:  Temp:  [97.9 °F (36.6 °C)-98.5 °F (36.9 °C)] 98.3 °F (36.8 °C)  Heart Rate:  [] 78  Resp:  [17-20] 18  BP: ()/(51-85) 100/58  Mean Arterial Pressure (Non-Invasive) for the past 24 hrs (Last 3 readings):   Noninvasive MAP (mmHg)   04/17/24 1530 98    04/17/24 1515 84   04/17/24 1500 74     SpO2 Percentage    04/17/24 2300 04/18/24 0300 04/18/24 0714   SpO2: 95% 98% 94%     SpO2:  [94 %-100 %] 94 %  on   ;   Device (Oxygen Therapy): room air    Body mass index is 27.07 kg/m².  Wt Readings from Last 3 Encounters:   04/17/24 73.8 kg (162 lb 11.2 oz)         Physical Exam:  Physical Exam  Vitals and nursing note reviewed.   Constitutional:       General: She is not in acute distress.  HENT:      Head: Normocephalic and atraumatic.   Eyes:      Extraocular Movements: Extraocular movements intact.      Conjunctiva/sclera: Conjunctivae normal.   Cardiovascular:      Rate and Rhythm: Normal rate and regular rhythm.   Pulmonary:      Effort: Pulmonary effort is normal.      Breath sounds: Rhonchi (mild, improved) present.   Abdominal:      Palpations: Abdomen is soft.      Tenderness: There is no abdominal tenderness.   Musculoskeletal:      Right lower leg: No edema.      Left lower leg: No edema.   Skin:     General: Skin is warm and dry.   Neurological:      Mental Status: She is alert. Mental status is at baseline.   Psychiatric:         Mood and Affect: Mood normal.         Behavior: Behavior normal.         Pertinent Laboratory/Radiology Results     Pertinent Laboratory Results:  Results from last 7 days   Lab Units 04/17/24  1603 04/17/24  1344   HSTROP T ng/L 15* 19*           Results from last 7 days   Lab Units 04/18/24  0149 04/17/24  1336 04/17/24  1255   CRP mg/dL  --   --  34.68*   LACTATE mmol/L  --  1.6  --    WBC 10*3/mm3 18.83*  --  29.28*   HEMOGLOBIN g/dL 11.8*  --  13.8   HEMATOCRIT % 37.3  --  42.1   MCV fL 94.0  --  90.7   MCHC g/dL 31.6  --  32.8   PLATELETS 10*3/mm3 177  --  201     Results from last 7 days   Lab Units 04/18/24  1000 04/18/24  0149 04/17/24  1344   SODIUM mmol/L  --  134* 130*   POTASSIUM mmol/L 3.9 3.1* 2.7*   MAGNESIUM mg/dL  --   --  1.9   CHLORIDE mmol/L  --  98 89*   CO2 mmol/L  --  27.8 27.8   BUN mg/dL  --  12 15  "  CREATININE mg/dL  --  0.48* 0.69   CALCIUM mg/dL  --  9.0 9.6   GLUCOSE mg/dL  --  110* 107*   ALBUMIN g/dL  --   --  3.9   BILIRUBIN mg/dL  --   --  1.1   ALK PHOS U/L  --   --  70   AST (SGOT) U/L  --   --  17   ALT (SGPT) U/L  --   --  10   Estimated Creatinine Clearance: 123.8 mL/min (A) (by C-G formula based on SCr of 0.48 mg/dL (L)).  No results found for: \"AMMONIA\"    No results found for: \"HGBA1C\", \"POCGLU\"  No results found for: \"HGBA1C\"  Lab Results   Component Value Date    TSH 3.320 04/17/2024       No results found for: \"BLOODCX\"  No results found for: \"URINECX\"  No results found for: \"WOUNDCX\"  No results found for: \"STOOLCX\"  No results found for: \"RESPCX\"  Pain Management Panel           No data to display                Pertinent Radiology Results:  Imaging Results (All)       Procedure Component Value Units Date/Time    CT Abdomen Pelvis With Contrast [[id number]] Collected: 04/17/24 1707     Updated: 04/17/24 1709    Narrative:      TECHNIQUE: Axial CT images of the abdomen/pelvis were obtained after IV  contrast administration. Images in bone and soft tissue windows were  reviewed. Coronal and sagittal reformatted images were provided. Images  obtained per as low as reasonably achievable (ALARA) protocols.     COMPARISON: None.     CONTRAST DOSE: 100 cc Isovue 370 given intravenously.     FINDINGS:     The liver, spleen, pancreas, adrenal glands, and kidneys are  unremarkable. The gallbladder is present with no biliary ductal  dilatation.     The abdominal aorta is nonaneurysmal. No enlarged abdominal or pelvic  lymph nodes. No abdominal fluid collections or free fluid.     The small and large bowel are normal in caliber with no obstruction or  adjacent inflammation. There is colonic diverticulosis. Status post  appendectomy.     The urinary bladder is grossly unremarkable. The uterus is surgically  absent.     No aggressive osseous lesions. Preserved lumbar vertebral body height.       " Impression:         1. No acute or significant abnormality in the abdomen/pelvis.        To TALK to On Call [phone]     This report was finalized on 4/17/2024 5:07 PM by Terry Nair MD.       CT Angiogram Chest Pulmonary Embolism [[id number]] Collected: 04/17/24 1656     Updated: 04/17/24 1709    Narrative:      TECHNIQUE: Axial CT images of the thorax were obtained after IV contrast  administration.  Images in lung and soft tissue windows were reviewed.  Coronal and sagittal reformatted images were provided. Images obtained  per as low as reasonably achievable (ALARA) protocols.     COMPARISON: None.     CONTRAST DOSE: 100 cc Isovue 370 given intravenously.     FINDINGS:     Lower neck/thyroid: Unremarkable.     Lungs: There is a consolidative opacity throughout the left lower lobe  compatible with lobar pneumonia, in series 4, image 109. There are  multifocal scattered patchy nodularities and ground-glass opacities  elsewhere throughout the lungs, compatible with multifocal infection.     There is at least mild centrilobular emphysema.     Central airway: Unremarkable.     Pleura: There is a trace amount of left pleural fluid.     Thoracic aorta and great vessels: Normal in diameter.     Pulmonary arteries: Unremarkable. No pulmonary embolism.     Heart and pericardium: No coronary arterial calcification. Unremarkable  cardiac morphology and pericardium.     Lymph nodes: No enlarged thoracic lymph nodes. Prominent left hilar  mandy tissue is presumably reactive given extensive pneumonia.     Mediastinum: Unremarkable.     Thoracic spine and chest wall: No thoracic vertebral body compression  deformity or aggressive osseous lesion.     Lines/Tubes/Devices/Hardware: None.       Impression:         1. There is consolidative lobar pneumonia in the left lower lobe, with  other multifocal patchy nodularities and ground-glass opacities,  compatible with multifocal infection scattered throughout the  lungs  bilaterally.     2. No pulmonary embolism.        To TALK to On Call [phone]     This report was finalized on 4/17/2024 5:07 PM by Terry Nair MD.       XR Chest 1 View [[id number]] Collected: 04/17/24 1312     Updated: 04/17/24 1327    Narrative:      PROCEDURE: XR CHEST 1 VW-       HISTORY: Chest Pain Protocol     COMPARISON: None.     FINDINGS: The heart is normal in size. The mediastinum is unremarkable.  There is blunting of the left costophrenic angle likely a small  effusion. There is left basilar atelectasis. The right lung is clear.  There is no pneumothorax. There are no acute osseous abnormalities.       Impression:      Small left effusion and basilar atelectasis.        This report was finalized on 4/17/2024 1:13 PM by Marques Echeverria M.D..       CT Head Without Contrast [[id number]] Collected: 04/17/24 1317     Updated: 04/17/24 1327    Narrative:      PROCEDURE: CT HEAD WO CONTRAST-     HISTORY: dizziness     COMPARISON:  None .     TECHNIQUE: Multiple axial CT images were performed from the foramen  magnum to the vertex without enhancement. This study was performed with  techniques to keep radiation doses as low as reasonably achievable  (ALARA). Individualized dose reduction techniques using automated  exposure control or adjustment of mA and/or kV according to the patient  size were employed.     FINDINGS: There is no CT evidence of hemorrhage. There is no mass, mass  effect or midline shift.  There is no hydrocephalus. The paranasal  sinuses are clear. Bone windows reveal no acute osseous abnormalities.       Impression:      No acute intracranial process.              This report was finalized on 4/17/2024 1:18 PM by Marques Echeverria M.D..               Discharge Disposition/Discharge Medications/Discharge Appointments     Discharge Disposition:   Home or Self Care    Condition at Discharge:  Stable     Discharge Medications:     Your medication list        START  taking these medications        Instructions Last Dose Given Next Dose Due   levoFLOXacin 750 MG tablet  Commonly known as: Levaquin      Take 1 tablet by mouth Daily for 5 days.              CONTINUE taking these medications        Instructions Last Dose Given Next Dose Due   albuterol sulfate  (90 Base) MCG/ACT inhaler  Commonly known as: PROVENTIL HFA;VENTOLIN HFA;PROAIR HFA      Inhale 2 puffs 2 (Two) Times a Day As Needed for Wheezing or Shortness of Air.       levothyroxine 100 MCG tablet  Commonly known as: SYNTHROID, LEVOTHROID      Take 1 tablet by mouth Every Morning.              STOP taking these medications      doxycycline 100 MG capsule  Commonly known as: VIBRAMYCIN        hydroCHLOROthiazide 25 MG tablet        losartan 25 MG tablet  Commonly known as: COZAAR                  Where to Get Your Medications        These medications were sent to [facility] - [place], [place] - [address] - [phone] [facility] [phone]  [address], [facility] [address]      Phone: [phone]   levoFLOXacin 750 MG tablet          Discharge Diet:  Diet Instructions    Regular as tolerated           Discharge Activity:  Activity Instructions    As tolerated             Time spent on this discharge exceeded 30 minutes.

## 2024-04-22 LAB
BACTERIA SPEC AEROBE CULT: NORMAL
BACTERIA SPEC AEROBE CULT: NORMAL

## 2024-05-21 ENCOUNTER — PATIENT ROUNDING (BHMG ONLY) (OUTPATIENT)
Dept: CARDIOLOGY | Facility: CLINIC | Age: 59
End: 2024-05-21
Payer: COMMERCIAL

## 2024-05-21 ENCOUNTER — OFFICE VISIT (OUTPATIENT)
Dept: CARDIOLOGY | Facility: CLINIC | Age: 59
End: 2024-05-21
Payer: COMMERCIAL

## 2024-05-21 VITALS
SYSTOLIC BLOOD PRESSURE: 121 MMHG | OXYGEN SATURATION: 95 % | WEIGHT: 164.6 LBS | DIASTOLIC BLOOD PRESSURE: 78 MMHG | BODY MASS INDEX: 27.42 KG/M2 | HEART RATE: 92 BPM | HEIGHT: 65 IN | RESPIRATION RATE: 16 BRPM

## 2024-05-21 DIAGNOSIS — I48.19 ATRIAL FIBRILLATION, PERSISTENT: Primary | ICD-10-CM

## 2024-05-21 DIAGNOSIS — I48.92 ATRIAL FLUTTER WITH CONTROLLED RESPONSE: ICD-10-CM

## 2024-05-21 DIAGNOSIS — F17.211 CIGARETTE NICOTINE DEPENDENCE IN REMISSION: ICD-10-CM

## 2024-05-21 DIAGNOSIS — R07.2 PRECORDIAL CHEST PAIN: ICD-10-CM

## 2024-05-21 DIAGNOSIS — I10 ESSENTIAL HYPERTENSION: ICD-10-CM

## 2024-05-21 DIAGNOSIS — R00.2 PALPITATIONS: ICD-10-CM

## 2024-05-21 DIAGNOSIS — R06.02 SHORTNESS OF BREATH: ICD-10-CM

## 2024-05-21 PROCEDURE — 93000 ELECTROCARDIOGRAM COMPLETE: CPT | Performed by: SPECIALIST

## 2024-05-21 PROCEDURE — 99204 OFFICE O/P NEW MOD 45 MIN: CPT | Performed by: SPECIALIST

## 2024-05-21 RX ORDER — HYDROCHLOROTHIAZIDE 25 MG/1
25 TABLET ORAL EVERY 24 HOURS
COMMUNITY

## 2024-05-21 NOTE — PROGRESS NOTES
A [employer] message has been sent to the patient for patient rounding for [facility].

## 2024-05-21 NOTE — PROGRESS NOTES
Subjective   Initial consultation, atrial fibrillation  Iqra López is a 59 y.o. female who presents to day for Abnormal ECG, Shortness of Breath (Recent hosp s/p pneumonia), and Med Management (verbal).    CHIEF COMPLIANT  Chief Complaint   Patient presents with    Abnormal ECG    Shortness of Breath     Recent hosp s/p pneumonia    Med Management     verbal       Active Problems:  Problem List Items Addressed This Visit          Cardiac and Vasculature    Atrial fibrillation, persistent - Primary    Relevant Medications    apixaban (ELIQUIS) 5 MG tablet tablet    Other Relevant Orders    ECG 12 Lead (Completed)    Adult Transthoracic Echo Complete w/ Color, Spectral and Contrast if necessary per protocol    Stress Test With Myocardial Perfusion One Day    CBC (No Diff)    Precordial chest pain    Relevant Orders    Adult Transthoracic Echo Complete w/ Color, Spectral and Contrast if necessary per protocol    Stress Test With Myocardial Perfusion One Day    Lipid Panel    Comprehensive Metabolic Panel    Essential hypertension    Relevant Medications    hydroCHLOROthiazide 25 MG tablet    Palpitations    Relevant Orders    Adult Transthoracic Echo Complete w/ Color, Spectral and Contrast if necessary per protocol    Stress Test With Myocardial Perfusion One Day    Atrial flutter with controlled response       Pulmonary and Pneumonias    Shortness of breath    Relevant Orders    Adult Transthoracic Echo Complete w/ Color, Spectral and Contrast if necessary per protocol    Stress Test With Myocardial Perfusion One Day       Tobacco    Cigarette nicotine dependence in remission       HPI  HPI  Patient back last April woke up with chest pain she describes as discomfort and heaviness with palpitation she was seen in the hospital she was found to have pneumonia and septic she is also hypokalemic and she was admitted to the hospital she was in atrial flutter she is discharged since she is doing a little bit better but  she does get palpitations sometimes no more chest discomfort since last visit short of breath class III at the moment, she had swelling but she is starting taking HCTZ the swelling was gone she used to have high blood pressure before the admission to the hospital she was taking HCTZ with losartan but this has been stopped because of the blood pressure was low at the hospital but since then she started the HCTZ again after the swelling she also used to be a heavy smoker for 50 years less than 1 pack/day and she quit back in January no history of diabetes unknown history of hyperlipidemia family wisely her mother had been told that she had heart attack in the past strokes pacemaker  PRIOR MEDS  Current Outpatient Medications on File Prior to Visit   Medication Sig Dispense Refill    albuterol sulfate  (90 Base) MCG/ACT inhaler Inhale 2 puffs 2 (Two) Times a Day As Needed for Wheezing or Shortness of Air.      hydroCHLOROthiazide 25 MG tablet Take 1 tablet by mouth Daily.      levothyroxine (SYNTHROID, LEVOTHROID) 100 MCG tablet Take 1 tablet by mouth Every Morning.       No current facility-administered medications on file prior to visit.       ALLERGIES  Bactrim [sulfamethoxazole-trimethoprim], Clindamycin/lincomycin, and Penicillins    HISTORY  Past Medical History:   Diagnosis Date    Hypertension     Thyroid disease        Social History     Socioeconomic History    Marital status:    Tobacco Use    Smoking status: Former     Types: Cigarettes    Smokeless tobacco: Never    Tobacco comments:     Quit 01/01/2024   Vaping Use    Vaping status: Never Used   Substance and Sexual Activity    Alcohol use: Never    Drug use: Never    Sexual activity: Defer       Family History   Problem Relation Age of Onset    Heart attack Mother        Review of Systems   Respiratory:  Positive for chest tightness and shortness of breath. Negative for apnea, cough, choking, wheezing and stridor.    Cardiovascular:   "Positive for palpitations and leg swelling.       Objective     VITALS: /78 (BP Location: Right arm, Cuff Size: Adult)   Pulse 92   Resp 16   Ht 165.1 cm (65\")   Wt 74.7 kg (164 lb 9.6 oz)   SpO2 95%   BMI 27.39 kg/m²     LABS:   Lab Results (most recent)       None            IMAGING:   CT Abdomen Pelvis With Contrast    Result Date: 4/17/2024   1. No acute or significant abnormality in the abdomen/pelvis.   To TALK to On Call [phone]  This report was finalized on 4/17/2024 5:07 PM by Terry Nair MD.      CT Angiogram Chest Pulmonary Embolism    Result Date: 4/17/2024   1. There is consolidative lobar pneumonia in the left lower lobe, with other multifocal patchy nodularities and ground-glass opacities, compatible with multifocal infection scattered throughout the lungs bilaterally.  2. No pulmonary embolism.   To TALK to On Call [phone]  This report was finalized on 4/17/2024 5:07 PM by Terry Nair MD.      XR Chest 1 View    Result Date: 4/17/2024  Small left effusion and basilar atelectasis.   This report was finalized on 4/17/2024 1:13 PM by Marques Echeverria M.D..      CT Head Without Contrast    Result Date: 4/17/2024  No acute intracranial process.     This report was finalized on 4/17/2024 1:18 PM by Marques Echeverria M.D..        EXAM:  Physical Exam  Vitals reviewed.   Constitutional:       Appearance: She is well-developed.   HENT:      Head: Normocephalic and atraumatic.   Eyes:      Pupils: Pupils are equal, round, and reactive to light.   Neck:      Thyroid: No thyromegaly.      Vascular: No JVD.   Cardiovascular:      Rate and Rhythm: Normal rate and regular rhythm.      Heart sounds: Normal heart sounds. No murmur heard.     No friction rub. No gallop.   Pulmonary:      Effort: Pulmonary effort is normal. No respiratory distress.      Breath sounds: Normal breath sounds. No stridor. No wheezing or rales.   Chest:      Chest wall: No tenderness. "   Musculoskeletal:         General: No tenderness or deformity.      Cervical back: Neck supple.   Skin:     General: Skin is warm and dry.   Neurological:      Mental Status: She is alert and oriented to person, place, and time.      Cranial Nerves: No cranial nerve deficit.      Coordination: Coordination normal.         Procedure     ECG 12 Lead    Date/Time: 5/21/2024 11:16 AM  Performed by: Magdy Collier MD    Authorized by: Magdy Collier MD      EKG, atrial flutter with controlled ventricular sponsor, comparing with the EKG 4/17/2020 T4 patient at the time was in atrial flutter with controlled response       Assessment & Plan     Diagnoses and all orders for this visit:    1. Atrial fibrillation, persistent (Primary)  -     ECG 12 Lead  -     apixaban (ELIQUIS) 5 MG tablet tablet; Take 1 tablet by mouth 2 (Two) Times a Day.  Dispense: 180 tablet; Refill: 1  -     Adult Transthoracic Echo Complete w/ Color, Spectral and Contrast if necessary per protocol; Future  -     Stress Test With Myocardial Perfusion One Day; Future  -     CBC (No Diff); Future    2. Precordial chest pain  -     Adult Transthoracic Echo Complete w/ Color, Spectral and Contrast if necessary per protocol; Future  -     Stress Test With Myocardial Perfusion One Day; Future  -     Lipid Panel; Future  -     Comprehensive Metabolic Panel; Future    3. Shortness of breath  -     Adult Transthoracic Echo Complete w/ Color, Spectral and Contrast if necessary per protocol; Future  -     Stress Test With Myocardial Perfusion One Day; Future    4. Essential hypertension    5. Palpitations  -     Adult Transthoracic Echo Complete w/ Color, Spectral and Contrast if necessary per protocol; Future  -     Stress Test With Myocardial Perfusion One Day; Future    6. Cigarette nicotine dependence in remission    7. Atrial flutter with controlled response    1.  Patient has been in atrial fibrillation at least since her admission with pneumonia back on  4/17/2024 will plan for cardioversion in the interim and will start her on Eliquis 5 mg p.o. twice daily and will plan for cardioversion in 3 to 4 weeks after anticoagulation she is currently rate controlled so we will continue current management  2.  Will get an echocardiac to assess cardiac function wall motion and valve morphology  3.  She had an episode of chest discomfort with atrial flutter I am going to go ahead and proceed with stress testing for assessment of ischemia she has class III dyspnea on exertion she would not be able to walk on the treadmill we will do a pharmacological stress test  4.  I will check a lipid profile for assessment of her cardiac risk  5.  She quit smoking back in January    Return After stress test.                 MEDS ORDERED DURING VISIT:  New Medications Ordered This Visit   Medications    apixaban (ELIQUIS) 5 MG tablet tablet     Sig: Take 1 tablet by mouth 2 (Two) Times a Day.     Dispense:  180 tablet     Refill:  1       As always, Kitty Kidd APRN  I appreciate very much the opportunity to participate in the cardiovascular care of your patients. Please do not hesitate to call me with any questions with regards to Iqra López evaluation and management.         This document has been electronically signed by Magdy Collier MD  May 21, 2024 12:56 EDT    This note is dictated utilizing voice recognition software.

## 2024-05-29 ENCOUNTER — OFFICE VISIT (OUTPATIENT)
Dept: PULMONOLOGY | Facility: CLINIC | Age: 59
End: 2024-05-29
Payer: COMMERCIAL

## 2024-05-29 VITALS
WEIGHT: 166 LBS | BODY MASS INDEX: 27.66 KG/M2 | TEMPERATURE: 98.4 F | HEART RATE: 81 BPM | DIASTOLIC BLOOD PRESSURE: 68 MMHG | HEIGHT: 65 IN | OXYGEN SATURATION: 96 % | SYSTOLIC BLOOD PRESSURE: 118 MMHG

## 2024-05-29 DIAGNOSIS — R05.3 CHRONIC COUGH: ICD-10-CM

## 2024-05-29 DIAGNOSIS — R06.02 SHORTNESS OF BREATH: Primary | ICD-10-CM

## 2024-05-29 DIAGNOSIS — G47.33 OSA (OBSTRUCTIVE SLEEP APNEA): ICD-10-CM

## 2024-05-29 RX ORDER — TIOTROPIUM BROMIDE INHALATION SPRAY 3.12 UG/1
2 SPRAY, METERED RESPIRATORY (INHALATION)
Qty: 1 EACH | Refills: 5 | Status: SHIPPED | OUTPATIENT
Start: 2024-05-29

## 2024-05-29 NOTE — PROGRESS NOTES
Chief Complaint  Pneumonia    Iqra López is a 59 y.o. female who presents today to [facility] for Pneumonia.    HPI:   Patient is a very pleasant 59-year-old female with more than 40 pack years of cigarette smoking.  She was recently hospitalized for hypoxic respiratory failure due to pneumonia.  She failed outpatient antibiotic regimen.    She feels like still not back to her baseline.  Reported shortness of breath after walking short distance.  She also reported unrefreshing sleep, morning headache and nocturnal cough.  Her  has noticed her stop breathing in her sleep.  She denies fever, night sweats, unintentional weight loss, rash, joint stiffness.    She does have history of arrhythmias but recently has not had any palpitation or lightheadedness.        Previous History:   Past Medical History:   Diagnosis Date    Hypertension     Pneumonia     Thyroid disease       Past Surgical History:   Procedure Laterality Date    HYSTERECTOMY      TUBAL ABDOMINAL LIGATION        Social History     Socioeconomic History    Marital status:    Tobacco Use    Smoking status: Former     Current packs/day: 0.00     Average packs/day: 1 pack/day for 50.0 years (50.0 ttl pk-yrs)     Types: Cigarettes     Quit date: 2024     Years since quittin.4     Passive exposure: Past    Smokeless tobacco: Never    Tobacco comments:     Quit 2024   Vaping Use    Vaping status: Every Day    Substances: Nicotine, Flavoring    Devices: Disposable   Substance and Sexual Activity    Alcohol use: Never    Drug use: Never    Sexual activity: Not Currently     Partners: Male     Birth control/protection: Birth control pill, Hysterectomy, Same-sex partner        Current Medications:  Current Outpatient Medications   Medication Sig Dispense Refill    albuterol sulfate  (90 Base) MCG/ACT inhaler Inhale 2 puffs 2 (Two) Times a Day As Needed for Wheezing or  "Shortness of Air.      apixaban (ELIQUIS) 5 MG tablet tablet Take 1 tablet by mouth 2 (Two) Times a Day. 180 tablet 1    hydroCHLOROthiazide 25 MG tablet Take 1 tablet by mouth Daily.      levothyroxine (SYNTHROID, LEVOTHROID) 100 MCG tablet Take 1 tablet by mouth Every Morning.      tiotropium bromide monohydrate (Spiriva Respimat) 2.5 MCG/ACT aerosol solution inhaler Inhale 2 puffs Daily. 1 each 5     No current facility-administered medications for this visit.       Allergies:   Allergies   Allergen Reactions    Bactrim [Sulfamethoxazole-Trimethoprim] Hives    Clindamycin/Lincomycin Hives    Penicillins Hives       Vitals:   /68   Pulse 81   Temp 98.4 °F (36.9 °C)   Ht 165.1 cm (65\")   Wt 75.3 kg (166 lb)   SpO2 96%   BMI 27.62 kg/m²     Estimated body mass index is 27.62 kg/m² as calculated from the following:    Height as of this encounter: 165.1 cm (65\").    Weight as of this encounter: 75.3 kg (166 lb).             Physical Exam:   Physical Exam  Vitals reviewed.   Constitutional:       Appearance: Normal appearance. She is obese.      Interventions: She is not intubated.  HENT:      Head: Normocephalic.      Nose: Nose normal.      Mouth/Throat:      Mouth: Mucous membranes are moist.      Pharynx: Posterior oropharyngeal erythema (Moderate oropharyngeal crowding) present.   Eyes:      Extraocular Movements: Extraocular movements intact.      Conjunctiva/sclera: Conjunctivae normal.      Pupils: Pupils are equal, round, and reactive to light.   Cardiovascular:      Rate and Rhythm: Normal rate.      Heart sounds: No murmur heard.     No friction rub. No gallop.   Pulmonary:      Effort: Pulmonary effort is normal. No tachypnea, bradypnea, accessory muscle usage, prolonged expiration, respiratory distress or retractions. She is not intubated.      Breath sounds: No stridor, decreased air movement or transmitted upper airway sounds. Wheezing (Distant breath sound, very subtle occasional wheezing " "only on forced exhalation) present.   Abdominal:      General: There is no distension.      Palpations: Abdomen is soft. There is no mass.      Tenderness: There is no abdominal tenderness. There is no right CVA tenderness, left CVA tenderness, guarding or rebound.      Hernia: No hernia is present.   Skin:     Coloration: Skin is not jaundiced.      Findings: No erythema.   Neurological:      General: No focal deficit present.      Mental Status: She is alert and oriented to person, place, and time.   Psychiatric:         Mood and Affect: Mood normal.          Procedures     STOP-Bang Score: STOP-Bang Score  Have you been diagnosed with Sleep Apnea?: no  Snoring?: yes  Tired?: yes  Observed?: no  Pressure?: yes  Stop Score: 3  Body Mass Index more than 35 kg/m2?: yes  Age older than 50 year old?: yes  Neck large? \">17\"/43cm-M, >16\"/41cm-F: no  Gender=Male?: no  Total Stop-Bang Score: 5   [place] Sleepiness Scale: [place] Sleepiness Scale  Sitting and reading: Would never doze  Watching TV: High chance of dozing  Sitting, inactive in a public place (e.g. a theatre or a meeting): Would never doze  As a passenger in a car for an hour without a break: High chance of dozing  Lying down to rest in the afternoon when circumstances permit: Would never doze  Sitting and talking to someone: Would never doze  Sitting quietly after a lunch without alcohol: Would never doze  In a car, while stopped for a few minutes in traffic: Would never doze  Total score: 6     Lab Results:   Admission on 04/17/2024, Discharged on 04/18/2024   Component Date Value Ref Range Status    QT Interval 04/17/2024 364  ms Final    QTC Interval 04/17/2024 450  ms Final    Glucose 04/17/2024 107 (H)  65 - 99 mg/dL Final    BUN 04/17/2024 15  8 - 23 mg/dL Final    Creatinine 04/17/2024 0.69  0.57 - 1.00 mg/dL Final    Sodium 04/17/2024 130 (L)  136 - 145 mmol/L Final    Potassium 04/17/2024 2.7 (L)  3.5 - 5.2 mmol/L Final    Slight hemolysis detected " by analyzer. Result may be falsely elevated.    Chloride 04/17/2024 89 (L)  98 - 107 mmol/L Final    CO2 04/17/2024 27.8  22.0 - 29.0 mmol/L Final    Calcium 04/17/2024 9.6  8.6 - 10.5 mg/dL Final    Total Protein 04/17/2024 6.6  6.0 - 8.5 g/dL Final    Albumin 04/17/2024 3.9  3.5 - 5.2 g/dL Final    ALT (SGPT) 04/17/2024 10  1 - 33 U/L Final    AST (SGOT) 04/17/2024 17  1 - 32 U/L Final    Alkaline Phosphatase 04/17/2024 70  39 - 117 U/L Final    Total Bilirubin 04/17/2024 1.1  0.0 - 1.2 mg/dL Final    Globulin 04/17/2024 2.7  gm/dL Final    A/G Ratio 04/17/2024 1.4  g/dL Final    BUN/Creatinine Ratio 04/17/2024 21.7  7.0 - 25.0 Final    Anion Gap 04/17/2024 13.2  5.0 - 15.0 mmol/L Final    eGFR 04/17/2024 98.9  >60.0 mL/min/1.73 Final    HS Troponin T 04/17/2024 19 (H)  <14 ng/L Final    Magnesium 04/17/2024 1.9  1.6 - 2.4 mg/dL Final    TSH 04/17/2024 3.320  0.270 - 4.200 uIU/mL Final    Extra Tube 04/17/2024 Hold for add-ons.   Final    Auto resulted.    Extra Tube 04/17/2024 hold for add-on   Final    Auto resulted    Extra Tube 04/17/2024 Hold for add-ons.   Final    Auto resulted.    Extra Tube 04/17/2024 Hold for add-ons.   Final    Auto resulted    WBC 04/17/2024 29.28 (H)  3.40 - 10.80 10*3/mm3 Final    RBC 04/17/2024 4.64  3.77 - 5.28 10*6/mm3 Final    Hemoglobin 04/17/2024 13.8  12.0 - 15.9 g/dL Final    Hematocrit 04/17/2024 42.1  34.0 - 46.6 % Final    MCV 04/17/2024 90.7  79.0 - 97.0 fL Final    MCH 04/17/2024 29.7  26.6 - 33.0 pg Final    MCHC 04/17/2024 32.8  31.5 - 35.7 g/dL Final    RDW 04/17/2024 12.7  12.3 - 15.4 % Final    RDW-SD 04/17/2024 41.7  37.0 - 54.0 fl Final    MPV 04/17/2024 9.8  6.0 - 12.0 fL Final    Platelets 04/17/2024 201  140 - 450 10*3/mm3 Final    Neutrophil % 04/17/2024 89.3 (H)  42.7 - 76.0 % Final    Lymphocyte % 04/17/2024 5.9 (L)  19.6 - 45.3 % Final    Monocyte % 04/17/2024 3.8 (L)  5.0 - 12.0 % Final    Eosinophil % 04/17/2024 0.0 (L)  0.3 - 6.2 % Final    Basophil %  04/17/2024 0.3  0.0 - 1.5 % Final    Immature Grans % 04/17/2024 0.7 (H)  0.0 - 0.5 % Final    Neutrophils, Absolute 04/17/2024 26.12 (H)  1.70 - 7.00 10*3/mm3 Final    Lymphocytes, Absolute 04/17/2024 1.74  0.70 - 3.10 10*3/mm3 Final    Monocytes, Absolute 04/17/2024 1.10 (H)  0.10 - 0.90 10*3/mm3 Final    Eosinophils, Absolute 04/17/2024 0.01  0.00 - 0.40 10*3/mm3 Final    Basophils, Absolute 04/17/2024 0.10  0.00 - 0.20 10*3/mm3 Final    Immature Grans, Absolute 04/17/2024 0.21 (H)  0.00 - 0.05 10*3/mm3 Final    nRBC 04/17/2024 0.0  0.0 - 0.2 /100 WBC Final    Blood Culture 04/17/2024 No growth at 5 days   Final    Blood Culture 04/17/2024 No growth at 5 days   Final    Lactate 04/17/2024 1.6  0.5 - 2.0 mmol/L Final    Color, UA 04/17/2024 Orange (A)  Yellow, Straw Final    Appearance, UA 04/17/2024 Cloudy (A)  Clear Final    pH, UA 04/17/2024 6.0  5.0 - 8.0 Final    Specific Gravity, UA 04/17/2024 1.023  1.005 - 1.030 Final    Glucose, UA 04/17/2024 Negative  Negative Final    Ketones, UA 04/17/2024 15 mg/dL (1+) (A)  Negative Final    Bilirubin, UA 04/17/2024 Small (1+) (A)  Negative Final    Blood, UA 04/17/2024 Negative  Negative Final    Protein, UA 04/17/2024 30 mg/dL (1+) (A)  Negative Final    Leuk Esterase, UA 04/17/2024 Moderate (2+) (A)  Negative Final    Nitrite, UA 04/17/2024 Negative  Negative Final    Urobilinogen, UA 04/17/2024 1.0 E.U./dL  0.2 - 1.0 E.U./dL Final    C-Reactive Protein 04/17/2024 34.68 (H)  0.00 - 0.50 mg/dL Final    Sed Rate 04/17/2024 39 (H)  0 - 30 mm/hr Final    RBC, UA 04/17/2024 None Seen  None Seen, 0-2 /HPF Final    WBC, UA 04/17/2024 11-20 (A)  None Seen, 0-2 /HPF Final    Bacteria, UA 04/17/2024 1+ (A)  None Seen /HPF Final    Squamous Epithelial Cells, UA 04/17/2024 3-6 (A)  None Seen, 0-2 /HPF Final    Hyaline Casts, UA 04/17/2024 None Seen  None Seen /LPF Final    Methodology 04/17/2024 Manual Light Microscopy   Final    HS Troponin T 04/17/2024 15 (H)  <14 ng/L Final     Troponin T Delta 04/17/2024 -4 (L)  >=-4 - <+4 ng/L Final    Urine Culture 04/17/2024 50,000 CFU/mL Normal Urogenital Laina   Final    ADENOVIRUS, PCR 04/17/2024 Not Detected  Not Detected Final    Coronavirus 229E 04/17/2024 Not Detected  Not Detected Final    Coronavirus HKU1 04/17/2024 Not Detected  Not Detected Final    Coronavirus NL63 04/17/2024 Not Detected  Not Detected Final    Coronavirus OC43 04/17/2024 Not Detected  Not Detected Final    COVID19 04/17/2024 Not Detected  Not Detected - Ref. Range Final    Human Metapneumovirus 04/17/2024 Detected (A)  Not Detected Final    Human Rhinovirus/Enterovirus 04/17/2024 Not Detected  Not Detected Final    Influenza A PCR 04/17/2024 Not Detected  Not Detected Final    Influenza B PCR 04/17/2024 Not Detected  Not Detected Final    Parainfluenza Virus 1 04/17/2024 Not Detected  Not Detected Final    Parainfluenza Virus 2 04/17/2024 Not Detected  Not Detected Final    Parainfluenza Virus 3 04/17/2024 Not Detected  Not Detected Final    Parainfluenza Virus 4 04/17/2024 Not Detected  Not Detected Final    RSV, PCR 04/17/2024 Not Detected  Not Detected Final    Bordetella pertussis pcr 04/17/2024 Not Detected  Not Detected Final    Bordetella parapertussis PCR 04/17/2024 Not Detected  Not Detected Final    Chlamydophila pneumoniae PCR 04/17/2024 Not Detected  Not Detected Final    Mycoplasma pneumo by PCR 04/17/2024 Not Detected  Not Detected Final    Mycoplasma pneumo IgM 04/17/2024 Negative  Negative Final    Glucose 04/18/2024 110 (H)  65 - 99 mg/dL Final    BUN 04/18/2024 12  8 - 23 mg/dL Final    Creatinine 04/18/2024 0.48 (L)  0.57 - 1.00 mg/dL Final    Sodium 04/18/2024 134 (L)  136 - 145 mmol/L Final    Potassium 04/18/2024 3.1 (L)  3.5 - 5.2 mmol/L Final    Chloride 04/18/2024 98  98 - 107 mmol/L Final    CO2 04/18/2024 27.8  22.0 - 29.0 mmol/L Final    Calcium 04/18/2024 9.0  8.6 - 10.5 mg/dL Final    BUN/Creatinine Ratio 04/18/2024 25.0  7.0 - 25.0 Final  "   Anion Gap 04/18/2024 8.2  5.0 - 15.0 mmol/L Final    eGFR 04/18/2024 107.9  >60.0 mL/min/1.73 Final    WBC 04/18/2024 18.83 (H)  3.40 - 10.80 10*3/mm3 Final    RBC 04/18/2024 3.97  3.77 - 5.28 10*6/mm3 Final    Hemoglobin 04/18/2024 11.8 (L)  12.0 - 15.9 g/dL Final    Hematocrit 04/18/2024 37.3  34.0 - 46.6 % Final    MCV 04/18/2024 94.0  79.0 - 97.0 fL Final    MCH 04/18/2024 29.7  26.6 - 33.0 pg Final    MCHC 04/18/2024 31.6  31.5 - 35.7 g/dL Final    RDW 04/18/2024 12.5  12.3 - 15.4 % Final    RDW-SD 04/18/2024 43.3  37.0 - 54.0 fl Final    MPV 04/18/2024 10.7  6.0 - 12.0 fL Final    Platelets 04/18/2024 177  140 - 450 10*3/mm3 Final    Procalcitonin 04/18/2024 0.89 (H)  0.00 - 0.25 ng/mL Final    Potassium 04/18/2024 3.9  3.5 - 5.2 mmol/L Final       Lab Results (last 72 hours)       ** No results found for the last 72 hours. **          WBC No results found for: \"WBC\"   HGB No results found for: \"HGB\"   HCT No results found for: \"HCT\"   Platlets No results found for: \"LABPLAT\"     PT/INR:  No results found for: \"PROTIME\"/No results found for: \"INR\"    Sodium No results found for: \"NA\"   Potassium No results found for: \"K\"   Chloride No results found for: \"CL\"   Bicarbonate No results found for: \"PLASMABICARB\"   BUN No results found for: \"BUN\"   Creatinine No results found for: \"CREATININE\"   Calcium No results found for: \"CALCIUM\"   Magnesium No results found for: \"MG\"     pH No results found for: \"PHART\"   pO2 No results found for: \"PO2ART\"   pCO2 No results found for: \"SNQ6IBW\"   HCO3 No results found for: \"TGB7OJD\"           Radiology Review:   Results for orders placed during the hospital encounter of 04/17/24    XR Chest 1 View    Narrative  PROCEDURE: XR CHEST 1 VW-    HISTORY: Chest Pain Protocol    COMPARISON: None.    FINDINGS: The heart is normal in size. The mediastinum is unremarkable.  There is blunting of the left costophrenic angle likely a small  effusion. There is left basilar atelectasis. " The right lung is clear.  There is no pneumothorax. There are no acute osseous abnormalities.    Impression  Small left effusion and basilar atelectasis.      This report was finalized on 4/17/2024 1:13 PM by Marques Echeverria M.D..     No results found for this or any previous visit.    No results found for this or any previous visit.    No results found for this or any previous visit.    No results found for this or any previous visit.    No results found for this or any previous visit.     No results found for this or any previous visit.    No results found for this or any previous visit.    No results found for this or any previous visit.                  Results Review: I reviewed the patient's new clinical results.    Assessment and Plan    Visit Diagnosis:     ICD-10-CM ICD-9-CM   1. Shortness of breath  R06.02 786.05   2. Chronic cough  R05.3 786.2   3. LUZ MARINA (obstructive sleep apnea)  G47.33 327.23   Patient with more than 50 pack years of smoking, quit 6 months ago has chronic cough and shortness of breath.  Suspect stage II COPD.  I would like to get a home oxygen evaluation, pulmonary function test.    High clinical suspicion of obstructive sleep apnea given history of observed apnea.  Will get a home sleep study.    Patient does have nocturnal cough and wheezing.  I will add Spiriva Respimat 2.5 mcg 2 puffs every morning.    Continue albuterol 2 puffs every 4 hours as needed.    Reassess in 6 weeks, earlier as needed    New Medications:   New Medications Ordered This Visit   Medications    tiotropium bromide monohydrate (Spiriva Respimat) 2.5 MCG/ACT aerosol solution inhaler     Sig: Inhale 2 puffs Daily.     Dispense:  1 each     Refill:  5       Discontinued Medications:   There are no discontinued medications.         Follow Up:       Patient was given instructions and counseling regarding her condition. Please see specific information pulled into the AVS if appropriate.       This document has been  electronically signed by Og Carrillo MD   May 29, 2024 15:01 EDT    Dictated Utilizing Dragon Dictation: Part of this note may be an electronic transcription/translation of spoken language to printed text using the Dragon Dictation System.

## 2024-06-28 ENCOUNTER — HOSPITAL ENCOUNTER (OUTPATIENT)
Dept: NUCLEAR MEDICINE | Facility: HOSPITAL | Age: 59
Discharge: HOME | End: 2024-06-28
Payer: COMMERCIAL

## 2024-06-28 ENCOUNTER — LAB (OUTPATIENT)
Dept: LAB | Facility: HOSPITAL | Age: 59
End: 2024-06-28
Payer: COMMERCIAL

## 2024-06-28 ENCOUNTER — HOSPITAL ENCOUNTER (OUTPATIENT)
Dept: CARDIOLOGY | Facility: HOSPITAL | Age: 59
Discharge: HOME | End: 2024-06-28
Payer: COMMERCIAL

## 2024-06-28 DIAGNOSIS — R07.2 PRECORDIAL CHEST PAIN: ICD-10-CM

## 2024-06-28 DIAGNOSIS — R00.2 PALPITATIONS: ICD-10-CM

## 2024-06-28 DIAGNOSIS — R06.02 SHORTNESS OF BREATH: ICD-10-CM

## 2024-06-28 DIAGNOSIS — I48.19 ATRIAL FIBRILLATION, PERSISTENT: ICD-10-CM

## 2024-06-28 LAB
ALBUMIN SERPL-MCNC: 4.6 G/DL (ref 3.5–5.2)
ALBUMIN/GLOB SERPL: 1.8 G/DL
ALP SERPL-CCNC: 75 U/L (ref 39–117)
ALT SERPL W P-5'-P-CCNC: 16 U/L (ref 1–33)
ANION GAP SERPL CALCULATED.3IONS-SCNC: 12.6 MMOL/L (ref 5–15)
AST SERPL-CCNC: 22 U/L (ref 1–32)
BH CV ECHO MEAS - AO MAX PG: 5.4 MMHG
BH CV ECHO MEAS - AO MEAN PG: 3 MMHG
BH CV ECHO MEAS - AO ROOT DIAM: 3.1 CM
BH CV ECHO MEAS - AO V2 MAX: 116 CM/SEC
BH CV ECHO MEAS - AO V2 VTI: 24.3 CM
BH CV ECHO MEAS - EDV(CUBED): 54.9 ML
BH CV ECHO MEAS - EDV(MOD-SP4): 24.5 ML
BH CV ECHO MEAS - EF(MOD-SP4): 73 %
BH CV ECHO MEAS - ESV(CUBED): 24.4 ML
BH CV ECHO MEAS - ESV(MOD-SP4): 6.6 ML
BH CV ECHO MEAS - FS: 23.7 %
BH CV ECHO MEAS - IVS/LVPW: 0.86 CM
BH CV ECHO MEAS - IVSD: 1.2 CM
BH CV ECHO MEAS - LA DIMENSION: 3.5 CM
BH CV ECHO MEAS - LAT PEAK E' VEL: 9.9 CM/SEC
BH CV ECHO MEAS - LV DIASTOLIC VOL/BSA (35-75): 13.4 CM2
BH CV ECHO MEAS - LV MASS(C)D: 173.1 GRAMS
BH CV ECHO MEAS - LV SYSTOLIC VOL/BSA (12-30): 3.6 CM2
BH CV ECHO MEAS - LVIDD: 3.8 CM
BH CV ECHO MEAS - LVIDS: 2.9 CM
BH CV ECHO MEAS - LVOT AREA: 3.8 CM2
BH CV ECHO MEAS - LVOT DIAM: 2.2 CM
BH CV ECHO MEAS - LVPWD: 1.4 CM
BH CV ECHO MEAS - MED PEAK E' VEL: 9.5 CM/SEC
BH CV ECHO MEAS - MV A MAX VEL: 64.5 CM/SEC
BH CV ECHO MEAS - MV E MAX VEL: 110 CM/SEC
BH CV ECHO MEAS - MV E/A: 1.71
BH CV ECHO MEAS - PA ACC TIME: 0.12 SEC
BH CV ECHO MEAS - SV(MOD-SP4): 17.9 ML
BH CV ECHO MEAS - SVI(MOD-SP4): 9.8 ML/M2
BH CV ECHO MEAS - TAPSE (>1.6): 1.47 CM
BH CV ECHO MEASUREMENTS AVERAGE E/E' RATIO: 11.34
BH CV NUCLEAR PRIOR STUDY: 3
BH CV REST NUCLEAR ISOTOPE DOSE: 9.6 MCI
BH CV STRESS BP STAGE 1: NORMAL
BH CV STRESS COMMENTS STAGE 1: NORMAL
BH CV STRESS DOSE REGADENOSON STAGE 1: 0.4
BH CV STRESS DURATION MIN STAGE 1: 0
BH CV STRESS DURATION SEC STAGE 1: 10
BH CV STRESS HR STAGE 1: 105
BH CV STRESS NUCLEAR ISOTOPE DOSE: 28.6 MCI
BH CV STRESS PROTOCOL 1: NORMAL
BH CV STRESS RECOVERY BP: NORMAL MMHG
BH CV STRESS RECOVERY HR: 82 BPM
BH CV STRESS STAGE 1: 1
BILIRUB SERPL-MCNC: 0.5 MG/DL (ref 0–1.2)
BUN SERPL-MCNC: 7 MG/DL (ref 8–23)
BUN/CREAT SERPL: 10.1 (ref 7–25)
CALCIUM SPEC-SCNC: 9.9 MG/DL (ref 8.6–10.5)
CHLORIDE SERPL-SCNC: 97 MMOL/L (ref 98–107)
CHOLEST SERPL-MCNC: 200 MG/DL (ref 0–200)
CO2 SERPL-SCNC: 26.4 MMOL/L (ref 22–29)
CREAT SERPL-MCNC: 0.69 MG/DL (ref 0.57–1)
DEPRECATED RDW RBC AUTO: 40.7 FL (ref 37–54)
EGFRCR SERPLBLD CKD-EPI 2021: 98.9 ML/MIN/1.73
ERYTHROCYTE [DISTWIDTH] IN BLOOD BY AUTOMATED COUNT: 12.7 % (ref 12.3–15.4)
GLOBULIN UR ELPH-MCNC: 2.6 GM/DL
GLUCOSE SERPL-MCNC: 91 MG/DL (ref 65–99)
HCT VFR BLD AUTO: 45.9 % (ref 34–46.6)
HDLC SERPL-MCNC: 67 MG/DL (ref 40–60)
HGB BLD-MCNC: 15.3 G/DL (ref 12–15.9)
LDLC SERPL CALC-MCNC: 115 MG/DL (ref 0–100)
LDLC/HDLC SERPL: 1.68 {RATIO}
LEFT ATRIUM VOLUME INDEX: 11.1 ML/M2
MAXIMAL PREDICTED HEART RATE: 159 BPM
MCH RBC QN AUTO: 29.4 PG (ref 26.6–33)
MCHC RBC AUTO-ENTMCNC: 33.3 G/DL (ref 31.5–35.7)
MCV RBC AUTO: 88.3 FL (ref 79–97)
PERCENT MAX PREDICTED HR: 66.04 %
PLATELET # BLD AUTO: 222 10*3/MM3 (ref 140–450)
PMV BLD AUTO: 10.4 FL (ref 6–12)
POTASSIUM SERPL-SCNC: 4.2 MMOL/L (ref 3.5–5.2)
PROT SERPL-MCNC: 7.2 G/DL (ref 6–8.5)
RBC # BLD AUTO: 5.2 10*6/MM3 (ref 3.77–5.28)
SODIUM SERPL-SCNC: 136 MMOL/L (ref 136–145)
STRESS BASELINE BP: NORMAL MMHG
STRESS BASELINE HR: 70 BPM
STRESS PERCENT HR: 78 %
STRESS POST PEAK BP: NORMAL MMHG
STRESS POST PEAK HR: 105 BPM
STRESS TARGET HR: 135 BPM
TRIGL SERPL-MCNC: 102 MG/DL (ref 0–150)
VLDLC SERPL-MCNC: 18 MG/DL (ref 5–40)
WBC NRBC COR # BLD AUTO: 7.77 10*3/MM3 (ref 3.4–10.8)

## 2024-06-28 PROCEDURE — 0 TECHNETIUM SESTAMIBI: Performed by: SPECIALIST

## 2024-06-28 PROCEDURE — 85027 COMPLETE CBC AUTOMATED: CPT

## 2024-06-28 PROCEDURE — 36415 COLL VENOUS BLD VENIPUNCTURE: CPT

## 2024-06-28 PROCEDURE — 78452 HT MUSCLE IMAGE SPECT MULT: CPT

## 2024-06-28 PROCEDURE — 25010000002 REGADENOSON 0.4 MG/5ML SOLUTION: Performed by: SPECIALIST

## 2024-06-28 PROCEDURE — 80061 LIPID PANEL: CPT

## 2024-06-28 PROCEDURE — A9500 TC99M SESTAMIBI: HCPCS | Performed by: SPECIALIST

## 2024-06-28 PROCEDURE — 80053 COMPREHEN METABOLIC PANEL: CPT

## 2024-06-28 PROCEDURE — 93306 TTE W/DOPPLER COMPLETE: CPT

## 2024-06-28 PROCEDURE — 93017 CV STRESS TEST TRACING ONLY: CPT

## 2024-06-28 RX ORDER — REGADENOSON 0.08 MG/ML
0.4 INJECTION, SOLUTION INTRAVENOUS
Status: COMPLETED | OUTPATIENT
Start: 2024-06-28 | End: 2024-06-28

## 2024-06-28 RX ADMIN — TECHNETIUM TC 99M SESTAMIBI 1 DOSE: 1 INJECTION INTRAVENOUS at 08:25

## 2024-06-28 RX ADMIN — REGADENOSON 0.4 MG: 0.08 INJECTION, SOLUTION INTRAVENOUS at 09:28

## 2024-06-28 RX ADMIN — TECHNETIUM TC 99M SESTAMIBI 1 DOSE: 1 INJECTION INTRAVENOUS at 09:28

## 2024-07-02 ENCOUNTER — HOSPITAL ENCOUNTER (OUTPATIENT)
Dept: RESPIRATORY THERAPY | Facility: HOSPITAL | Age: 59
Discharge: HOME | End: 2024-07-02
Admitting: INTERNAL MEDICINE
Payer: COMMERCIAL

## 2024-07-02 VITALS — OXYGEN SATURATION: 97 % | RESPIRATION RATE: 18 BRPM | HEART RATE: 86 BPM

## 2024-07-02 DIAGNOSIS — R06.02 SHORTNESS OF BREATH: ICD-10-CM

## 2024-07-02 PROCEDURE — 94799 UNLISTED PULMONARY SVC/PX: CPT

## 2024-07-02 PROCEDURE — 94729 DIFFUSING CAPACITY: CPT

## 2024-07-02 PROCEDURE — 94060 EVALUATION OF WHEEZING: CPT

## 2024-07-02 PROCEDURE — 94640 AIRWAY INHALATION TREATMENT: CPT

## 2024-07-02 PROCEDURE — 94760 N-INVAS EAR/PLS OXIMETRY 1: CPT

## 2024-07-02 PROCEDURE — 94726 PLETHYSMOGRAPHY LUNG VOLUMES: CPT

## 2024-07-02 RX ORDER — ALBUTEROL SULFATE 2.5 MG/3ML
2.5 SOLUTION RESPIRATORY (INHALATION) EVERY 4 HOURS PRN
Status: DISCONTINUED | OUTPATIENT
Start: 2024-07-02 | End: 2024-07-03 | Stop reason: HOSPADM

## 2024-07-02 RX ADMIN — ALBUTEROL SULFATE 2.5 MG: 2.5 SOLUTION RESPIRATORY (INHALATION) at 15:09

## 2024-07-08 LAB
BH CV NUCLEAR PRIOR STUDY: 3
BH CV REST NUCLEAR ISOTOPE DOSE: 9.6 MCI
BH CV STRESS BP STAGE 1: NORMAL
BH CV STRESS COMMENTS STAGE 1: NORMAL
BH CV STRESS DOSE REGADENOSON STAGE 1: 0.4
BH CV STRESS DURATION MIN STAGE 1: 0
BH CV STRESS DURATION SEC STAGE 1: 10
BH CV STRESS HR STAGE 1: 105
BH CV STRESS NUCLEAR ISOTOPE DOSE: 28.6 MCI
BH CV STRESS PROTOCOL 1: NORMAL
BH CV STRESS RECOVERY BP: NORMAL MMHG
BH CV STRESS RECOVERY HR: 82 BPM
BH CV STRESS STAGE 1: 1
LV EF NUC BP: 71 %
MAXIMAL PREDICTED HEART RATE: 159 BPM
PERCENT MAX PREDICTED HR: 66.04 %
STRESS BASELINE BP: NORMAL MMHG
STRESS BASELINE HR: 70 BPM
STRESS PERCENT HR: 78 %
STRESS POST PEAK BP: NORMAL MMHG
STRESS POST PEAK HR: 105 BPM
STRESS TARGET HR: 135 BPM

## 2024-07-22 ENCOUNTER — OFFICE VISIT (OUTPATIENT)
Dept: CARDIOLOGY | Facility: CLINIC | Age: 59
End: 2024-07-22
Payer: COMMERCIAL

## 2024-07-22 VITALS
OXYGEN SATURATION: 95 % | SYSTOLIC BLOOD PRESSURE: 144 MMHG | DIASTOLIC BLOOD PRESSURE: 84 MMHG | HEART RATE: 78 BPM | BODY MASS INDEX: 27.49 KG/M2 | HEIGHT: 65 IN | WEIGHT: 165 LBS

## 2024-07-22 DIAGNOSIS — R00.2 PALPITATIONS: ICD-10-CM

## 2024-07-22 DIAGNOSIS — R07.2 PRECORDIAL CHEST PAIN: ICD-10-CM

## 2024-07-22 DIAGNOSIS — R06.02 SHORTNESS OF BREATH: ICD-10-CM

## 2024-07-22 DIAGNOSIS — F17.211 CIGARETTE NICOTINE DEPENDENCE IN REMISSION: ICD-10-CM

## 2024-07-22 DIAGNOSIS — I48.19 ATRIAL FIBRILLATION, PERSISTENT: ICD-10-CM

## 2024-07-22 DIAGNOSIS — I48.92 ATRIAL FLUTTER WITH CONTROLLED RESPONSE: Primary | ICD-10-CM

## 2024-07-22 DIAGNOSIS — I10 ESSENTIAL HYPERTENSION: ICD-10-CM

## 2024-07-22 DIAGNOSIS — E78.5 DYSLIPIDEMIA: ICD-10-CM

## 2024-07-22 PROCEDURE — 99214 OFFICE O/P EST MOD 30 MIN: CPT | Performed by: SPECIALIST

## 2024-07-22 PROCEDURE — 93000 ELECTROCARDIOGRAM COMPLETE: CPT | Performed by: SPECIALIST

## 2024-07-22 RX ORDER — LOSARTAN POTASSIUM 50 MG/1
50 TABLET ORAL DAILY
Qty: 90 TABLET | Refills: 1 | Status: SHIPPED | OUTPATIENT
Start: 2024-07-22

## 2024-07-22 RX ORDER — LOSARTAN POTASSIUM 50 MG/1
50 TABLET ORAL DAILY
Qty: 90 TABLET | Refills: 1 | Status: SHIPPED | OUTPATIENT
Start: 2024-07-22 | End: 2024-07-22 | Stop reason: SDUPTHER

## 2024-07-22 NOTE — PROGRESS NOTES
Subjective   Follow up, paroxysmal atrial fibrillation, HTN  Iqra López is a 59 y.o. female who presents to day for Results (STRESS/ECHO AND LABS).    CHIEF COMPLIANT  Chief Complaint   Patient presents with    Results     STRESS/ECHO AND LABS       Active Problems:  Problem List Items Addressed This Visit          Cardiac and Vasculature    Atrial fibrillation, persistent    Relevant Orders    Cardioversion External in Cardiology Department    Precordial chest pain    Essential hypertension    Relevant Medications    losartan (Cozaar) 50 MG tablet    Palpitations    Atrial flutter with controlled response - Primary    Dyslipidemia       Pulmonary and Pneumonias    Shortness of breath       Tobacco    Cigarette nicotine dependence in remission       HPI  HPI  Patient has been doing the same since last visit, continues to have occasional palpitations, and shortness on exertion on moderate exertion, no chest pain, no edema, but blood pressure has been recently elevated, she has been compliant with the anticoagulant  PRIOR MEDS  Current Outpatient Medications on File Prior to Visit   Medication Sig Dispense Refill    albuterol sulfate  (90 Base) MCG/ACT inhaler Inhale 2 puffs 2 (Two) Times a Day As Needed for Wheezing or Shortness of Air.      apixaban (ELIQUIS) 5 MG tablet tablet Take 1 tablet by mouth 2 (Two) Times a Day. 180 tablet 1    hydroCHLOROthiazide 25 MG tablet Take 1 tablet by mouth Daily.      levothyroxine (SYNTHROID, LEVOTHROID) 100 MCG tablet Take 1 tablet by mouth Every Morning.      tiotropium bromide monohydrate (Spiriva Respimat) 2.5 MCG/ACT aerosol solution inhaler Inhale 2 puffs Daily. 1 each 5     No current facility-administered medications on file prior to visit.       ALLERGIES  Bactrim [sulfamethoxazole-trimethoprim], Clindamycin/lincomycin, and Penicillins    HISTORY  Past Medical History:   Diagnosis Date    Hypertension     Pneumonia     Thyroid disease        Social History  "    Socioeconomic History    Marital status:    Tobacco Use    Smoking status: Former     Current packs/day: 0.00     Average packs/day: 1 pack/day for 50.0 years (50.0 ttl pk-yrs)     Types: Cigarettes     Quit date: 2024     Years since quittin.5     Passive exposure: Past    Smokeless tobacco: Never    Tobacco comments:     Quit 2024   Vaping Use    Vaping status: Every Day    Substances: Nicotine, Flavoring    Devices: Disposable   Substance and Sexual Activity    Alcohol use: Never    Drug use: Never    Sexual activity: Not Currently     Partners: Male     Birth control/protection: Birth control pill, Hysterectomy, Same-sex partner       Family History   Problem Relation Age of Onset    Heart attack Mother     Cancer Father     Hypertension Father     Heart failure Maternal Aunt     Lung disease Maternal Aunt     Diabetes Maternal Uncle     Diabetes Maternal Uncle        Review of Systems    Objective     VITALS: /84   Pulse 78   Ht 165.1 cm (65\")   Wt 74.8 kg (165 lb)   SpO2 95%   BMI 27.46 kg/m²     LABS:   Lab Results (most recent)       None            IMAGING:   CT Abdomen Pelvis With Contrast    Result Date: 2024   1. No acute or significant abnormality in the abdomen/pelvis.   To TALK to On Call [phone]  This report was finalized on 2024 5:07 PM by Terry Nair MD.      CT Angiogram Chest Pulmonary Embolism    Result Date: 2024   1. There is consolidative lobar pneumonia in the left lower lobe, with other multifocal patchy nodularities and ground-glass opacities, compatible with multifocal infection scattered throughout the lungs bilaterally.  2. No pulmonary embolism.   To TALK to On Call [phone]  This report was finalized on 2024 5:07 PM by Terry Nair MD.      XR Chest 1 View    Result Date: 2024  Small left effusion and basilar atelectasis.   This report was finalized on 2024 1:13 PM by Marques" INEZ Echeverria.      CT Head Without Contrast    Result Date: 4/17/2024  No acute intracranial process.     This report was finalized on 4/17/2024 1:18 PM by Marques Echeverria M.D..        EXAM:  Physical Exam  Vitals reviewed.   Constitutional:       Appearance: She is well-developed.   HENT:      Head: Normocephalic and atraumatic.   Eyes:      Pupils: Pupils are equal, round, and reactive to light.   Neck:      Thyroid: No thyromegaly.      Vascular: No JVD.   Cardiovascular:      Rate and Rhythm: Normal rate. Rhythm irregular.      Heart sounds: Normal heart sounds. No murmur heard.     No friction rub. No gallop.   Pulmonary:      Effort: Pulmonary effort is normal. No respiratory distress.      Breath sounds: Normal breath sounds. No stridor. No wheezing or rales.   Chest:      Chest wall: No tenderness.   Musculoskeletal:         General: No tenderness or deformity.      Cervical back: Neck supple.   Skin:     General: Skin is warm and dry.   Neurological:      Mental Status: She is alert and oriented to person, place, and time.      Cranial Nerves: No cranial nerve deficit.      Coordination: Coordination normal.         Procedure     ECG 12 Lead    Date/Time: 7/22/2024 12:21 PM  Performed by: Magdy Collier MD    Authorized by: Magdy Collier MD  Comparison: compared with previous ECG   Comments: EKG: Atrial fibrillation with controlled ventricular response otherwise unremarkable EKG, compared with the EKG on 5/21/2024 no significant change              Assessment & Plan     Diagnoses and all orders for this visit:    1. Atrial flutter with controlled response (Primary)    2. Atrial fibrillation, persistent  -     Cardioversion External in Cardiology Department; Future    3. Precordial chest pain    4. Palpitations    5. Essential hypertension  -     losartan (Cozaar) 50 MG tablet; Take 1 tablet by mouth Daily.  Dispense: 90 tablet; Refill: 1    6. Shortness of breath    7. Dyslipidemia    8. Cigarette  nicotine dependence in remission    Other orders  -     ECG 12 Lead    1.  While she is still in atrial fibrillation we discussed about cardioversion we will going to proceed with cardioversion states that she has been compliant with the anticoagulation  2.  We discussed the results of the stress test which did not show ischemia and showed probably breast attenuation artifact  3.  Discussed echocardiogram with normal systolic function but with LVH probably related to hypertension  4.  Her blood pressure is elevated she discontinued discontinue the losartan before I am going to restart this  5.  We discussed lab results with high per lipidemia try to do healthy diet first before starting on statin    Return in about 4 weeks (around 8/19/2024).             MEDS ORDERED DURING VISIT:  New Medications Ordered This Visit   Medications    losartan (Cozaar) 50 MG tablet     Sig: Take 1 tablet by mouth Daily.     Dispense:  90 tablet     Refill:  1       As always, Kitty Kidd APRN  I appreciate very much the opportunity to participate in the cardiovascular care of your patients. Please do not hesitate to call me with any questions with regards to Iqra Rene evaluation and management.         This document has been electronically signed by Magdy Collier MD  July 22, 2024 12:26 EDT    This note is dictated utilizing voice recognition software.

## 2024-07-22 NOTE — PROGRESS NOTES
Kitty Kidd APRN  Iqra López  2024    Patient Active Problem List   Diagnosis    Atrial fibrillation, persistent    Precordial chest pain    Shortness of breath    Essential hypertension    Palpitations    Cigarette nicotine dependence in remission    Atrial flutter with controlled response       Dear Kitty Kidd APRN:    Subjective     History of Present Illness:    Chief Complaint   Patient presents with    Results     STRESS/ECHO AND LABS       Iqra López is a pleasant 59 y.o. female with a past medical history significant for    Allergies   Allergen Reactions    Bactrim [Sulfamethoxazole-Trimethoprim] Hives    Clindamycin/Lincomycin Hives    Penicillins Hives   :      Current Outpatient Medications:     albuterol sulfate  (90 Base) MCG/ACT inhaler, Inhale 2 puffs 2 (Two) Times a Day As Needed for Wheezing or Shortness of Air., Disp: , Rfl:     apixaban (ELIQUIS) 5 MG tablet tablet, Take 1 tablet by mouth 2 (Two) Times a Day., Disp: 180 tablet, Rfl: 1    hydroCHLOROthiazide 25 MG tablet, Take 1 tablet by mouth Daily., Disp: , Rfl:     levothyroxine (SYNTHROID, LEVOTHROID) 100 MCG tablet, Take 1 tablet by mouth Every Morning., Disp: , Rfl:     tiotropium bromide monohydrate (Spiriva Respimat) 2.5 MCG/ACT aerosol solution inhaler, Inhale 2 puffs Daily., Disp: 1 each, Rfl: 5    The following portions of the patient's history were reviewed and updated as appropriate: allergies, current medications, past family history, past medical history, past social history, past surgical history and problem list.    Social History     Tobacco Use    Smoking status: Former     Current packs/day: 0.00     Average packs/day: 1 pack/day for 50.0 years (50.0 ttl pk-yrs)     Types: Cigarettes     Quit date: 2024     Years since quittin.5     Passive exposure: Past    Smokeless tobacco: Never    Tobacco comments:     Quit 2024   Vaping Use    Vaping status: Every  "Day    Substances: Nicotine, Flavoring    Devices: Disposable   Substance Use Topics    Alcohol use: Never    Drug use: Never         Objective   Vitals:    07/22/24 1117   Height: 165.1 cm (65\")     Body mass index is 27.62 kg/m².    ROS    Physical Exam    Lab Results   Component Value Date     06/28/2024    K 4.2 06/28/2024    CL 97 (L) 06/28/2024    CO2 26.4 06/28/2024    BUN 7 (L) 06/28/2024    CREATININE 0.69 06/28/2024    GLUCOSE 91 06/28/2024    CALCIUM 9.9 06/28/2024    AST 22 06/28/2024    ALT 16 06/28/2024    ALKPHOS 75 06/28/2024     No results found for: \"CKTOTAL\"  Lab Results   Component Value Date    WBC 7.77 06/28/2024    HGB 15.3 06/28/2024    HCT 45.9 06/28/2024     06/28/2024     No results found for: \"INR\"  Lab Results   Component Value Date    MG 1.9 04/17/2024     Lab Results   Component Value Date    TSH 3.320 04/17/2024    TRIG 102 06/28/2024    HDL 67 (H) 06/28/2024     (H) 06/28/2024      No results found for: \"BNP\"    During this visit the following were done:  Labs Reviewed []    Labs Ordered []    Radiology Reports Reviewed []    Radiology Ordered []    PCP Records Reviewed []    Referring Provider Records Reviewed []    ER Records Reviewed []    Hospital Records Reviewed []    History Obtained From Family []    Radiology Images Reviewed []    Other Reviewed []    Records Requested []       Procedures    Assessment & Plan   No diagnosis found.         Recommendations:      No follow-ups on file.    As always, I appreciate very much the opportunity to participate in the cardiovascular care of your patients.      With Best Regards,    Liam Menard PA-C          "

## 2024-07-22 NOTE — H&P (VIEW-ONLY)
Subjective   Follow up, paroxysmal atrial fibrillation, HTN  Iqra López is a 59 y.o. female who presents to day for Results (STRESS/ECHO AND LABS).    CHIEF COMPLIANT  Chief Complaint   Patient presents with    Results     STRESS/ECHO AND LABS       Active Problems:  Problem List Items Addressed This Visit          Cardiac and Vasculature    Atrial fibrillation, persistent    Relevant Orders    Cardioversion External in Cardiology Department    Precordial chest pain    Essential hypertension    Relevant Medications    losartan (Cozaar) 50 MG tablet    Palpitations    Atrial flutter with controlled response - Primary    Dyslipidemia       Pulmonary and Pneumonias    Shortness of breath       Tobacco    Cigarette nicotine dependence in remission       HPI  HPI  Patient has been doing the same since last visit, continues to have occasional palpitations, and shortness on exertion on moderate exertion, no chest pain, no edema, but blood pressure has been recently elevated, she has been compliant with the anticoagulant  PRIOR MEDS  Current Outpatient Medications on File Prior to Visit   Medication Sig Dispense Refill    albuterol sulfate  (90 Base) MCG/ACT inhaler Inhale 2 puffs 2 (Two) Times a Day As Needed for Wheezing or Shortness of Air.      apixaban (ELIQUIS) 5 MG tablet tablet Take 1 tablet by mouth 2 (Two) Times a Day. 180 tablet 1    hydroCHLOROthiazide 25 MG tablet Take 1 tablet by mouth Daily.      levothyroxine (SYNTHROID, LEVOTHROID) 100 MCG tablet Take 1 tablet by mouth Every Morning.      tiotropium bromide monohydrate (Spiriva Respimat) 2.5 MCG/ACT aerosol solution inhaler Inhale 2 puffs Daily. 1 each 5     No current facility-administered medications on file prior to visit.       ALLERGIES  Bactrim [sulfamethoxazole-trimethoprim], Clindamycin/lincomycin, and Penicillins    HISTORY  Past Medical History:   Diagnosis Date    Hypertension     Pneumonia     Thyroid disease        Social History  "    Socioeconomic History    Marital status:    Tobacco Use    Smoking status: Former     Current packs/day: 0.00     Average packs/day: 1 pack/day for 50.0 years (50.0 ttl pk-yrs)     Types: Cigarettes     Quit date: 2024     Years since quittin.5     Passive exposure: Past    Smokeless tobacco: Never    Tobacco comments:     Quit 2024   Vaping Use    Vaping status: Every Day    Substances: Nicotine, Flavoring    Devices: Disposable   Substance and Sexual Activity    Alcohol use: Never    Drug use: Never    Sexual activity: Not Currently     Partners: Male     Birth control/protection: Birth control pill, Hysterectomy, Same-sex partner       Family History   Problem Relation Age of Onset    Heart attack Mother     Cancer Father     Hypertension Father     Heart failure Maternal Aunt     Lung disease Maternal Aunt     Diabetes Maternal Uncle     Diabetes Maternal Uncle        Review of Systems    Objective     VITALS: /84   Pulse 78   Ht 165.1 cm (65\")   Wt 74.8 kg (165 lb)   SpO2 95%   BMI 27.46 kg/m²     LABS:   Lab Results (most recent)       None            IMAGING:   CT Abdomen Pelvis With Contrast    Result Date: 2024   1. No acute or significant abnormality in the abdomen/pelvis.   To TALK to On Call [phone]  This report was finalized on 2024 5:07 PM by Terry Nair MD.      CT Angiogram Chest Pulmonary Embolism    Result Date: 2024   1. There is consolidative lobar pneumonia in the left lower lobe, with other multifocal patchy nodularities and ground-glass opacities, compatible with multifocal infection scattered throughout the lungs bilaterally.  2. No pulmonary embolism.   To TALK to On Call [phone]  This report was finalized on 2024 5:07 PM by Terry Nair MD.      XR Chest 1 View    Result Date: 2024  Small left effusion and basilar atelectasis.   This report was finalized on 2024 1:13 PM by Marques" INEZ Echeverria.      CT Head Without Contrast    Result Date: 4/17/2024  No acute intracranial process.     This report was finalized on 4/17/2024 1:18 PM by Marques Echeverria M.D..        EXAM:  Physical Exam  Vitals reviewed.   Constitutional:       Appearance: She is well-developed.   HENT:      Head: Normocephalic and atraumatic.   Eyes:      Pupils: Pupils are equal, round, and reactive to light.   Neck:      Thyroid: No thyromegaly.      Vascular: No JVD.   Cardiovascular:      Rate and Rhythm: Normal rate. Rhythm irregular.      Heart sounds: Normal heart sounds. No murmur heard.     No friction rub. No gallop.   Pulmonary:      Effort: Pulmonary effort is normal. No respiratory distress.      Breath sounds: Normal breath sounds. No stridor. No wheezing or rales.   Chest:      Chest wall: No tenderness.   Musculoskeletal:         General: No tenderness or deformity.      Cervical back: Neck supple.   Skin:     General: Skin is warm and dry.   Neurological:      Mental Status: She is alert and oriented to person, place, and time.      Cranial Nerves: No cranial nerve deficit.      Coordination: Coordination normal.         Procedure     ECG 12 Lead    Date/Time: 7/22/2024 12:21 PM  Performed by: Magdy Collier MD    Authorized by: Magdy Collier MD  Comparison: compared with previous ECG   Comments: EKG: Atrial fibrillation with controlled ventricular response otherwise unremarkable EKG, compared with the EKG on 5/21/2024 no significant change              Assessment & Plan     Diagnoses and all orders for this visit:    1. Atrial flutter with controlled response (Primary)    2. Atrial fibrillation, persistent  -     Cardioversion External in Cardiology Department; Future    3. Precordial chest pain    4. Palpitations    5. Essential hypertension  -     losartan (Cozaar) 50 MG tablet; Take 1 tablet by mouth Daily.  Dispense: 90 tablet; Refill: 1    6. Shortness of breath    7. Dyslipidemia    8. Cigarette  nicotine dependence in remission    Other orders  -     ECG 12 Lead    1.  While she is still in atrial fibrillation we discussed about cardioversion we will going to proceed with cardioversion states that she has been compliant with the anticoagulation  2.  We discussed the results of the stress test which did not show ischemia and showed probably breast attenuation artifact  3.  Discussed echocardiogram with normal systolic function but with LVH probably related to hypertension  4.  Her blood pressure is elevated she discontinued discontinue the losartan before I am going to restart this  5.  We discussed lab results with high per lipidemia try to do healthy diet first before starting on statin    Return in about 4 weeks (around 8/19/2024).             MEDS ORDERED DURING VISIT:  New Medications Ordered This Visit   Medications    losartan (Cozaar) 50 MG tablet     Sig: Take 1 tablet by mouth Daily.     Dispense:  90 tablet     Refill:  1       As always, Kitty Kidd APRN  I appreciate very much the opportunity to participate in the cardiovascular care of your patients. Please do not hesitate to call me with any questions with regards to Iqra Rene evaluation and management.         This document has been electronically signed by Magdy Collier MD  July 22, 2024 12:26 EDT    This note is dictated utilizing voice recognition software.

## 2024-08-06 ENCOUNTER — ANESTHESIA (OUTPATIENT)
Dept: CARDIOLOGY | Facility: HOSPITAL | Age: 59
End: 2024-08-06
Payer: COMMERCIAL

## 2024-08-06 ENCOUNTER — ANESTHESIA EVENT (OUTPATIENT)
Dept: CARDIOLOGY | Facility: HOSPITAL | Age: 59
End: 2024-08-06
Payer: COMMERCIAL

## 2024-08-06 ENCOUNTER — HOSPITAL ENCOUNTER (OUTPATIENT)
Dept: CARDIOLOGY | Facility: HOSPITAL | Age: 59
Discharge: HOME | End: 2024-08-06
Payer: COMMERCIAL

## 2024-08-06 VITALS
SYSTOLIC BLOOD PRESSURE: 144 MMHG | HEIGHT: 65 IN | HEART RATE: 75 BPM | BODY MASS INDEX: 27.49 KG/M2 | WEIGHT: 165 LBS | OXYGEN SATURATION: 94 % | TEMPERATURE: 98.2 F | RESPIRATION RATE: 23 BRPM | DIASTOLIC BLOOD PRESSURE: 87 MMHG

## 2024-08-06 DIAGNOSIS — I48.19 ATRIAL FIBRILLATION, PERSISTENT: ICD-10-CM

## 2024-08-06 PROCEDURE — 25810000003 SODIUM CHLORIDE 0.9 % SOLUTION: Performed by: NURSE ANESTHETIST, CERTIFIED REGISTERED

## 2024-08-06 PROCEDURE — 92960 CARDIOVERSION ELECTRIC EXT: CPT

## 2024-08-06 PROCEDURE — 93005 ELECTROCARDIOGRAM TRACING: CPT | Performed by: SPECIALIST

## 2024-08-06 PROCEDURE — 25010000002 PROPOFOL 10 MG/ML EMULSION: Performed by: NURSE ANESTHETIST, CERTIFIED REGISTERED

## 2024-08-06 RX ORDER — SODIUM CHLORIDE 9 MG/ML
INJECTION, SOLUTION INTRAVENOUS CONTINUOUS PRN
Status: DISCONTINUED | OUTPATIENT
Start: 2024-08-06 | End: 2024-08-06 | Stop reason: SURG

## 2024-08-06 RX ORDER — PROPOFOL 10 MG/ML
VIAL (ML) INTRAVENOUS AS NEEDED
Status: DISCONTINUED | OUTPATIENT
Start: 2024-08-06 | End: 2024-08-06 | Stop reason: SURG

## 2024-08-06 RX ORDER — LIDOCAINE HYDROCHLORIDE 20 MG/ML
INJECTION, SOLUTION EPIDURAL; INFILTRATION; INTRACAUDAL; PERINEURAL AS NEEDED
Status: DISCONTINUED | OUTPATIENT
Start: 2024-08-06 | End: 2024-08-06 | Stop reason: SURG

## 2024-08-06 RX ADMIN — PROPOFOL 50 MG: 10 INJECTION, EMULSION INTRAVENOUS at 09:45

## 2024-08-06 RX ADMIN — SODIUM CHLORIDE: 9 INJECTION, SOLUTION INTRAVENOUS at 09:43

## 2024-08-06 RX ADMIN — LIDOCAINE HYDROCHLORIDE 5 ML: 20 INJECTION, SOLUTION EPIDURAL; INFILTRATION; INTRACAUDAL; PERINEURAL at 09:45

## 2024-08-06 NOTE — ANESTHESIA PREPROCEDURE EVALUATION
Anesthesia Evaluation     NPO Solid Status: > 8 hours             Airway   Mallampati: I  TM distance: >3 FB  Neck ROM: full  No difficulty expected  Dental - normal exam     Pulmonary - normal exam   (+) pneumonia ,shortness of breath  Cardiovascular - normal exam    (+) hypertension, dysrhythmias Atrial Fib      Neuro/Psych  GI/Hepatic/Renal/Endo    (+) thyroid problem     Musculoskeletal     Abdominal  - normal exam    Bowel sounds: normal.   Substance History      OB/GYN          Other                    Anesthesia Plan    ASA 3     general   total IV anesthesia  intravenous induction     Anesthetic plan, risks, benefits, and alternatives have been provided, discussed and informed consent has been obtained with: patient.    CODE STATUS:

## 2024-08-06 NOTE — ANESTHESIA POSTPROCEDURE EVALUATION
Patient: Iqra López    Procedure Summary       Date: 08/06/24 Room / Location: [facility]    Anesthesia Start: 0943 Anesthesia Stop: 0951    Procedure: CARDIOVERSION EXTERNAL IN CARDIOLOGY DEPARTMENT Diagnosis:       Atrial fibrillation, persistent      (persistent atrial fibrillation)    Scheduled Providers: Magdy Collier MD Provider: Miles Daley MD    Anesthesia Type: general ASA Status: 3            Anesthesia Type: general    Vitals  Vitals Value Taken Time   /87 08/06/24 1015   Temp 98.3    Pulse 75 08/06/24 1015   Resp 23 08/06/24 1015   SpO2 94 % 08/06/24 1015           Post Anesthesia Care and Evaluation    Patient location during evaluation: PHASE II  Patient participation: complete - patient participated  Level of consciousness: awake and alert  Pain score: 0  Pain management: adequate    Airway patency: patent  Anesthetic complications: No anesthetic complications    Cardiovascular status: acceptable  Respiratory status: acceptable  Hydration status: acceptable

## 2024-08-06 NOTE — ANESTHESIA POSTPROCEDURE EVALUATION
Patient: Iqra López    Procedure Summary       Date: 08/06/24 Room / Location: [facility]    Anesthesia Start: 0943 Anesthesia Stop: 0951    Procedure: CARDIOVERSION EXTERNAL IN CARDIOLOGY DEPARTMENT Diagnosis:       Atrial fibrillation, persistent      (persistent atrial fibrillation)    Scheduled Providers: Magdy Collier MD Provider: Miles Daley MD    Anesthesia Type: general ASA Status: 3            Anesthesia Type: general    Vitals  Vitals Value Taken Time   /87 08/06/24 1015   Temp     Pulse 75 08/06/24 1015   Resp 23 08/06/24 1015   SpO2 94 % 08/06/24 1015           Post Anesthesia Care and Evaluation    Patient location during evaluation: PHASE II  Patient participation: complete - patient participated  Level of consciousness: awake and alert  Pain score: 0  Pain management: adequate    Airway patency: patent  Anesthetic complications: No anesthetic complications    Cardiovascular status: acceptable  Respiratory status: acceptable  Hydration status: acceptable

## 2024-08-07 LAB
QT INTERVAL: 406 MS
QTC INTERVAL: 462 MS

## 2024-08-22 ENCOUNTER — OFFICE VISIT (OUTPATIENT)
Dept: PULMONOLOGY | Facility: CLINIC | Age: 59
End: 2024-08-22
Payer: COMMERCIAL

## 2024-08-22 VITALS
BODY MASS INDEX: 27.72 KG/M2 | SYSTOLIC BLOOD PRESSURE: 128 MMHG | HEART RATE: 67 BPM | TEMPERATURE: 99 F | WEIGHT: 166.4 LBS | OXYGEN SATURATION: 98 % | HEIGHT: 65 IN | DIASTOLIC BLOOD PRESSURE: 74 MMHG

## 2024-08-22 DIAGNOSIS — J44.9 CHRONIC OBSTRUCTIVE PULMONARY DISEASE, UNSPECIFIED COPD TYPE: Primary | ICD-10-CM

## 2024-08-22 PROCEDURE — 99214 OFFICE O/P EST MOD 30 MIN: CPT | Performed by: INTERNAL MEDICINE

## 2024-08-22 RX ORDER — ALBUTEROL SULFATE 90 UG/1
2 AEROSOL, METERED RESPIRATORY (INHALATION) EVERY 4 HOURS PRN
Qty: 1 G | Refills: 5 | Status: SHIPPED | OUTPATIENT
Start: 2024-08-22

## 2024-08-22 RX ORDER — TIOTROPIUM BROMIDE INHALATION SPRAY 3.12 UG/1
2 SPRAY, METERED RESPIRATORY (INHALATION)
Qty: 1 EACH | Refills: 5 | Status: SHIPPED | OUTPATIENT
Start: 2024-08-22

## 2024-08-22 NOTE — PROGRESS NOTES
Chief Complaint  Shortness of Breath    Iqra López is a 59 y.o. female who presents today to [facility] for Shortness of Breath.    HPI:   Shortness of Breath       Patient is a very pleasant 59-year-old female with more than 40 pack years of cigarette smoking.  Back in January.    She was hospitalized earlier this year due to multifocal nodular pneumonia.    After my initial outpatient visit I ordered a sleep study and a pulmonary function test.    Sleep study showed AHI of 4 which felt short of the definition of obstructive sleep apnea.    PFTs done 2024.  It showed FEV1 FvC ratio of 55, postbronchodilator FEV1 40%, 1.02 L.  Diffusion capacity 62%.  Unclear reason total lung capacity and lung volumes were not done.  Result of the pulmonary function test discussed with the patient    Patient reported breathing much better since she was started on Spiriva Respimat 2 puffs every morning.  No significant use of rescue inhaler.  She denies nocturnal cough and wheezing.    Usually gets refreshing sleep.      She denies fever, night sweats, unintentional weight loss, rash, joint stiffness.     She does have history of arrhythmias but recently has not had any palpitation or lightheadedness.  Previous History:   Past Medical History:   Diagnosis Date    Hypertension     Pneumonia     Thyroid disease       Past Surgical History:   Procedure Laterality Date    HYSTERECTOMY      TUBAL ABDOMINAL LIGATION        Social History     Socioeconomic History    Marital status:    Tobacco Use    Smoking status: Former     Current packs/day: 0.00     Average packs/day: 1 pack/day for 50.0 years (50.0 ttl pk-yrs)     Types: Cigarettes     Quit date: 2024     Years since quittin.6     Passive exposure: Past    Smokeless tobacco: Never    Tobacco comments:     Quit 2024   Vaping Use    Vaping status: Every Day    Substances: Nicotine, Flavoring    Devices:  "Disposable   Substance and Sexual Activity    Alcohol use: Never    Drug use: Never    Sexual activity: Not Currently     Partners: Male     Birth control/protection: Birth control pill, Hysterectomy, Partner of same sex        Current Medications:  Current Outpatient Medications   Medication Sig Dispense Refill    albuterol sulfate  (90 Base) MCG/ACT inhaler Inhale 2 puffs Every 4 (Four) Hours As Needed for Wheezing or Shortness of Air. 1 g 5    apixaban (ELIQUIS) 5 MG tablet tablet Take 1 tablet by mouth 2 (Two) Times a Day. 180 tablet 1    hydroCHLOROthiazide 25 MG tablet Take 1 tablet by mouth Daily.      levothyroxine (SYNTHROID, LEVOTHROID) 100 MCG tablet Take 1 tablet by mouth Every Morning.      losartan (Cozaar) 50 MG tablet Take 1 tablet by mouth Daily. (Patient taking differently: Take 12.5 mg by mouth Daily.) 90 tablet 1    tiotropium bromide monohydrate (Spiriva Respimat) 2.5 MCG/ACT aerosol solution inhaler Inhale 2 puffs Daily. 1 each 5     No current facility-administered medications for this visit.       Allergies:   Allergies   Allergen Reactions    Bactrim [Sulfamethoxazole-Trimethoprim] Hives    Clindamycin/Lincomycin Hives    Penicillins Hives       Vitals:   /74   Pulse 67   Temp 99 °F (37.2 °C)   Ht 165.1 cm (65\")   Wt 75.5 kg (166 lb 6.4 oz)   SpO2 98%   BMI 27.69 kg/m²     Estimated body mass index is 27.69 kg/m² as calculated from the following:    Height as of this encounter: 165.1 cm (65\").    Weight as of this encounter: 75.5 kg (166 lb 6.4 oz).    Iqra López  reports that she quit smoking about 7 months ago. Her smoking use included cigarettes. She has a 50 pack-year smoking history. She has been exposed to tobacco smoke. She has never used smokeless tobacco.         Physical Exam:   Physical Exam  Vitals reviewed.   Constitutional:       Appearance: Normal appearance. She is obese.      Interventions: She is not intubated.  HENT:      Head: Normocephalic.      " Nose: Nose normal.      Mouth/Throat:      Mouth: Mucous membranes are moist.   Eyes:      Extraocular Movements: Extraocular movements intact.      Conjunctiva/sclera: Conjunctivae normal.      Pupils: Pupils are equal, round, and reactive to light.   Cardiovascular:      Rate and Rhythm: Normal rate.      Heart sounds: No murmur heard.     No friction rub. No gallop.   Pulmonary:      Effort: Pulmonary effort is normal. No tachypnea, bradypnea, accessory muscle usage, prolonged expiration, respiratory distress or retractions. She is not intubated.      Breath sounds: Normal breath sounds. No stridor, decreased air movement or transmitted upper airway sounds.   Abdominal:      General: There is no distension.      Palpations: Abdomen is soft. There is no mass.      Tenderness: There is no abdominal tenderness. There is no right CVA tenderness, left CVA tenderness, guarding or rebound.      Hernia: No hernia is present.   Skin:     Coloration: Skin is not jaundiced.      Findings: No erythema.   Neurological:      General: No focal deficit present.      Mental Status: She is alert and oriented to person, place, and time.   Psychiatric:         Mood and Affect: Mood normal.          Procedures     STOP-Bang Score:     [place] Sleepiness Scale:       Lab Results:   Hospital Outpatient Visit on 08/06/2024   Component Date Value Ref Range Status    QT Interval 08/06/2024 406  ms Final    QTC Interval 08/06/2024 462  ms Final   Hospital Outpatient Visit on 06/28/2024   Component Date Value Ref Range Status    LVIDd 06/28/2024 3.8  cm Final    LVIDs 06/28/2024 2.9  cm Final    IVSd 06/28/2024 1.20  cm Final    LVPWd 06/28/2024 1.40  cm Final    FS 06/28/2024 23.7  % Final    IVS/LVPW 06/28/2024 0.86  cm Final    ESV(cubed) 06/28/2024 24.4  ml Final    LV Sys Vol (BSA corrected) 06/28/2024 3.6  cm2 Final    EDV(cubed) 06/28/2024 54.9  ml Final    LV Martins Vol (BSA corrected) 06/28/2024 13.4  cm2 Final    LV mass(C)d  06/28/2024 173.1  grams Final    LVOT area 06/28/2024 3.8  cm2 Final    LVOT diam 06/28/2024 2.20  cm Final    EDV(MOD-sp4) 06/28/2024 24.5  ml Final    ESV(MOD-sp4) 06/28/2024 6.6  ml Final    SV(MOD-sp4) 06/28/2024 17.9  ml Final    SVi(MOD-SP4) 06/28/2024 9.8  ml/m2 Final    EF(MOD-sp4) 06/28/2024 73.0  % Final    MV E max rojas 06/28/2024 110.0  cm/sec Final    MV A max rojas 06/28/2024 64.5  cm/sec Final    MV E/A 06/28/2024 1.71   Final    LA ESV Index (BP) 06/28/2024 11.1  ml/m2 Final    Med Peak E' Rojas 06/28/2024 9.5  cm/sec Final    Lat Peak E' Rojas 06/28/2024 9.9  cm/sec Final    Avg E/e' ratio 06/28/2024 11.34   Final    TAPSE (>1.6) 06/28/2024 1.47  cm Final    LA dimension (2D)  06/28/2024 3.5  cm Final    Ao pk rojas 06/28/2024 116.0  cm/sec Final    Ao max PG 06/28/2024 5.4  mmHg Final    Ao mean PG 06/28/2024 3.0  mmHg Final    Ao V2 VTI 06/28/2024 24.3  cm Final    PA acc time 06/28/2024 0.12  sec Final    Ao root diam 06/28/2024 3.1  cm Final   Hospital Outpatient Visit on 06/28/2024   Component Date Value Ref Range Status    Nuclear Prior Study 06/28/2024 3.0   Final    BH CV REST NUCLEAR ISOTOPE DOSE 06/28/2024 9.6  mCi Final    BH CV STRESS NUCLEAR ISOTOPE DOSE 06/28/2024 28.6  mCi Final    BH CV STRESS PROTOCOL 1 06/28/2024 Pharmacologic   Final    Stage 1 06/28/2024 1.0   Final    HR Stage 1 06/28/2024 105   Final    BP Stage 1 06/28/2024 155/97   Final    Duration Min Stage 1 06/28/2024 0   Final    Duration Sec Stage 1 06/28/2024 10   Final    Stress Dose Regadenoson Stage 1 06/28/2024 0.40   Final    Stress Comments Stage 1 06/28/2024 10 sec bolus injection   Final    Baseline HR 06/28/2024 70  bpm Final    Baseline BP 06/28/2024 140/94  mmHg Final    Peak HR 06/28/2024 105  bpm Final    Peak BP 06/28/2024 155/97  mmHg Final    Recovery HR 06/28/2024 82  bpm Final    Recovery BP 06/28/2024 153/96  mmHg Final    Target HR (85%) 06/28/2024 135  bpm Final    Max. Pred. HR (100%) 06/28/2024 159  bpm  Final    Percent Max Pred HR 06/28/2024 66.04  % Final    Percent Target HR 06/28/2024 78  % Final    Nuc Stress EF 06/28/2024 71  % Final   Lab on 06/28/2024   Component Date Value Ref Range Status    Total Cholesterol 06/28/2024 200  0 - 200 mg/dL Final    Triglycerides 06/28/2024 102  0 - 150 mg/dL Final    HDL Cholesterol 06/28/2024 67 (H)  40 - 60 mg/dL Final    LDL Cholesterol  06/28/2024 115 (H)  0 - 100 mg/dL Final    VLDL Cholesterol 06/28/2024 18  5 - 40 mg/dL Final    LDL/HDL Ratio 06/28/2024 1.68   Final    Glucose 06/28/2024 91  65 - 99 mg/dL Final    BUN 06/28/2024 7 (L)  8 - 23 mg/dL Final    Creatinine 06/28/2024 0.69  0.57 - 1.00 mg/dL Final    Sodium 06/28/2024 136  136 - 145 mmol/L Final    Potassium 06/28/2024 4.2  3.5 - 5.2 mmol/L Final    Slight hemolysis detected by analyzer. Result may be falsely elevated.    Chloride 06/28/2024 97 (L)  98 - 107 mmol/L Final    CO2 06/28/2024 26.4  22.0 - 29.0 mmol/L Final    Calcium 06/28/2024 9.9  8.6 - 10.5 mg/dL Final    Total Protein 06/28/2024 7.2  6.0 - 8.5 g/dL Final    Albumin 06/28/2024 4.6  3.5 - 5.2 g/dL Final    ALT (SGPT) 06/28/2024 16  1 - 33 U/L Final    AST (SGOT) 06/28/2024 22  1 - 32 U/L Final    Alkaline Phosphatase 06/28/2024 75  39 - 117 U/L Final    Total Bilirubin 06/28/2024 0.5  0.0 - 1.2 mg/dL Final    Globulin 06/28/2024 2.6  gm/dL Final    A/G Ratio 06/28/2024 1.8  g/dL Final    BUN/Creatinine Ratio 06/28/2024 10.1  7.0 - 25.0 Final    Anion Gap 06/28/2024 12.6  5.0 - 15.0 mmol/L Final    eGFR 06/28/2024 98.9  >60.0 mL/min/1.73 Final    WBC 06/28/2024 7.77  3.40 - 10.80 10*3/mm3 Final    RBC 06/28/2024 5.20  3.77 - 5.28 10*6/mm3 Final    Hemoglobin 06/28/2024 15.3  12.0 - 15.9 g/dL Final    Hematocrit 06/28/2024 45.9  34.0 - 46.6 % Final    MCV 06/28/2024 88.3  79.0 - 97.0 fL Final    MCH 06/28/2024 29.4  26.6 - 33.0 pg Final    MCHC 06/28/2024 33.3  31.5 - 35.7 g/dL Final    RDW 06/28/2024 12.7  12.3 - 15.4 % Final    RDW-SD  "06/28/2024 40.7  37.0 - 54.0 fl Final    MPV 06/28/2024 10.4  6.0 - 12.0 fL Final    Platelets 06/28/2024 222  140 - 450 10*3/mm3 Final       Lab Results (last 72 hours)       ** No results found for the last 72 hours. **          WBC No results found for: \"WBC\"   HGB No results found for: \"HGB\"   HCT No results found for: \"HCT\"   Platlets No results found for: \"LABPLAT\"     PT/INR:  No results found for: \"PROTIME\"/No results found for: \"INR\"    Sodium No results found for: \"NA\"   Potassium No results found for: \"K\"   Chloride No results found for: \"CL\"   Bicarbonate No results found for: \"PLASMABICARB\"   BUN No results found for: \"BUN\"   Creatinine No results found for: \"CREATININE\"   Calcium No results found for: \"CALCIUM\"   Magnesium No results found for: \"MG\"     pH No results found for: \"PHART\"   pO2 No results found for: \"PO2ART\"   pCO2 No results found for: \"UBL4CGN\"   HCO3 No results found for: \"CKY5UXU\"           Radiology Review:   Results for orders placed during the hospital encounter of 04/17/24    XR Chest 1 View    Narrative  PROCEDURE: XR CHEST 1 VW-    HISTORY: Chest Pain Protocol    COMPARISON: None.    FINDINGS: The heart is normal in size. The mediastinum is unremarkable.  There is blunting of the left costophrenic angle likely a small  effusion. There is left basilar atelectasis. The right lung is clear.  There is no pneumothorax. There are no acute osseous abnormalities.    Impression  Small left effusion and basilar atelectasis.      This report was finalized on 4/17/2024 1:13 PM by Marques Echeverria M.D..     No results found for this or any previous visit.    No results found for this or any previous visit.    No results found for this or any previous visit.    No results found for this or any previous visit.    No results found for this or any previous visit.     No results found for this or any previous visit.    No results found for this or any previous visit.    No results found for " this or any previous visit.                  Results Review: I reviewed the patient's new clinical results.    Assessment and Plan    Visit Diagnosis:     ICD-10-CM ICD-9-CM   1. Chronic obstructive pulmonary disease, unspecified COPD type  J44.9 496   Gold stage III class A COPD.  Doing well on Spiriva Respimat.    Continue albuterol as needed.    Patient has had multifocal nodular airspace disease.  I would like to repeat CAT scan in October.  Follow-up after CAT scan.        New Medications:   New Medications Ordered This Visit   Medications    albuterol sulfate  (90 Base) MCG/ACT inhaler     Sig: Inhale 2 puffs Every 4 (Four) Hours As Needed for Wheezing or Shortness of Air.     Dispense:  1 g     Refill:  5    tiotropium bromide monohydrate (Spiriva Respimat) 2.5 MCG/ACT aerosol solution inhaler     Sig: Inhale 2 puffs Daily.     Dispense:  1 each     Refill:  5       Discontinued Medications:   Medications Discontinued During This Encounter   Medication Reason    albuterol sulfate  (90 Base) MCG/ACT inhaler Reorder    tiotropium bromide monohydrate (Spiriva Respimat) 2.5 MCG/ACT aerosol solution inhaler Reorder            Follow Up:       Patient was given instructions and counseling regarding her condition. Please see specific information pulled into the AVS if appropriate.       This document has been electronically signed by Og Carrillo MD   August 22, 2024 16:03 EDT    Dictated Utilizing Dragon Dictation: Part of this note may be an electronic transcription/translation of spoken language to printed text using the Dragon Dictation System.

## 2024-08-23 ENCOUNTER — OFFICE VISIT (OUTPATIENT)
Dept: CARDIOLOGY | Facility: CLINIC | Age: 59
End: 2024-08-23
Payer: COMMERCIAL

## 2024-08-23 VITALS
SYSTOLIC BLOOD PRESSURE: 109 MMHG | HEIGHT: 65 IN | OXYGEN SATURATION: 97 % | HEART RATE: 77 BPM | DIASTOLIC BLOOD PRESSURE: 66 MMHG | WEIGHT: 166 LBS | BODY MASS INDEX: 27.66 KG/M2

## 2024-08-23 DIAGNOSIS — I48.19 ATRIAL FIBRILLATION, PERSISTENT: ICD-10-CM

## 2024-08-23 DIAGNOSIS — I10 ESSENTIAL HYPERTENSION: ICD-10-CM

## 2024-08-23 PROCEDURE — 99213 OFFICE O/P EST LOW 20 MIN: CPT | Performed by: NURSE PRACTITIONER

## 2024-08-23 RX ORDER — LOSARTAN POTASSIUM 25 MG/1
12.5 TABLET ORAL DAILY
Qty: 60 TABLET | Refills: 1 | Status: SHIPPED | OUTPATIENT
Start: 2024-08-23

## 2024-08-23 NOTE — PROGRESS NOTES
[facility]             LinetteJACKIE Hardy Tammie L, APRN  Iqra López  1965 08/23/2024    Patient Active Problem List   Diagnosis    Atrial fibrillation, persistent    Precordial chest pain    Shortness of breath    Essential hypertension    Palpitations    Cigarette nicotine dependence in remission    Atrial flutter with controlled response    Dyslipidemia       Dear Kitty Kidd APRN:    Subjective     Chief Complaint   Patient presents with    Follow-up       HPI:     This is a 59 y.o. female with known past medical history of persistent atrial fibrillation, essential hypertension and dyslipidemia.    Iqra López presents today for routine cardiology follow up.  Patient recently underwent successful cardioversion on 8/12/2024.  States she is feeling much better since her cardioversion.  Maintaining normal sinus rhythm.  Blood pressure controlled.  Recent lab work stable    Diagnostic Testing  Echocardiogram 6/2024: EF 61 to 65%  Nuclear stress test 7/2024:  Small fixed mid anterior, apical, apical inferior extending to mid inferior wall, the anterior, apical walls defect with normal wall motion most likely represent breast attenuation artifact, with possible mid to distal inferior infarction, with possible slight herminia-infarction inferior ischemia, TID 1.14.  Successful cardioversion 8/2024     All other systems were reviewed and were negative.    Patient Active Problem List   Diagnosis    Atrial fibrillation, persistent    Precordial chest pain    Shortness of breath    Essential hypertension    Palpitations    Cigarette nicotine dependence in remission    Atrial flutter with controlled response    Dyslipidemia       family history includes Cancer in her father; Diabetes in her maternal uncle and maternal uncle; Heart attack in her mother; Heart failure in her maternal aunt; Hypertension in her father; Lung disease in her  maternal aunt.     reports that she quit smoking about 7 months ago. Her smoking use included cigarettes. She has a 50 pack-year smoking history. She has been exposed to tobacco smoke. She has never used smokeless tobacco. She reports that she does not drink alcohol and does not use drugs.    Allergies   Allergen Reactions    Bactrim [Sulfamethoxazole-Trimethoprim] Hives    Clindamycin/Lincomycin Hives    Penicillins Hives         Current Outpatient Medications:     apixaban (ELIQUIS) 5 MG tablet tablet, Take 1 tablet by mouth 2 (Two) Times a Day., Disp: 180 tablet, Rfl: 1    hydroCHLOROthiazide 25 MG tablet, Take 1 tablet by mouth Daily., Disp: , Rfl:     levothyroxine (SYNTHROID, LEVOTHROID) 100 MCG tablet, Take 1 tablet by mouth Every Morning., Disp: , Rfl:     losartan (Cozaar) 25 MG tablet, Take 0.5 tablets by mouth Daily., Disp: 60 tablet, Rfl: 1    tiotropium bromide monohydrate (Spiriva Respimat) 2.5 MCG/ACT aerosol solution inhaler, Inhale 2 puffs Daily., Disp: 1 each, Rfl: 5    albuterol sulfate  (90 Base) MCG/ACT inhaler, Inhale 2 puffs Every 4 (Four) Hours As Needed for Wheezing or Shortness of Air. (Patient not taking: Reported on 8/23/2024), Disp: 1 g, Rfl: 5      Physical Exam:  I have reviewed the patient's current vital signs as documented in the patient's EMR.   Vitals:    08/23/24 1029   BP: 109/66   Pulse: 77   SpO2: 97%     Body mass index is 27.62 kg/m².       08/23/24  1029   Weight: 75.3 kg (166 lb)      Physical Exam  Constitutional:       General: She is not in acute distress.     Appearance: Normal appearance. She is well-developed and normal weight.   HENT:      Head: Normocephalic and atraumatic.   Eyes:      General: Lids are normal.      Conjunctiva/sclera: Conjunctivae normal.      Pupils: Pupils are equal, round, and reactive to light.   Neck:      Vascular: No carotid bruit or JVD.   Cardiovascular:      Rate and Rhythm: Normal rate and regular rhythm.      Pulses: Normal  pulses.      Heart sounds: Normal heart sounds, S1 normal and S2 normal. No murmur heard.  Pulmonary:      Effort: Pulmonary effort is normal. No respiratory distress.      Breath sounds: Normal breath sounds. No wheezing.   Abdominal:      General: Bowel sounds are normal. There is no distension.      Palpations: Abdomen is soft. There is no hepatomegaly or splenomegaly.      Tenderness: There is no abdominal tenderness.   Musculoskeletal:         General: No swelling. Normal range of motion.      Cervical back: Normal range of motion and neck supple.      Right lower leg: No edema.      Left lower leg: No edema.   Skin:     General: Skin is warm and dry.      Coloration: Skin is not jaundiced.      Findings: No rash.   Neurological:      General: No focal deficit present.      Mental Status: She is alert and oriented to person, place, and time. Mental status is at baseline.   Psychiatric:         Mood and Affect: Mood normal.         Speech: Speech normal.         Behavior: Behavior normal.         Thought Content: Thought content normal.         Judgment: Judgment normal.            DATA REVIEWED:     TTE/GUIDO:  Results for orders placed during the hospital encounter of 06/28/24    Adult Transthoracic Echo Complete w/ Color, Spectral and Contrast if necessary per protocol    Interpretation Summary    Left ventricular systolic function is normal. Left ventricular ejection fraction appears to be 61 - 65%.    Left ventricular wall thickness is consistent with mild concentric hypertrophy.    Left ventricular diastolic function was normal.      Laboratory evaluations:    Lab Results   Component Value Date    GLUCOSE 91 06/28/2024    BUN 7 (L) 06/28/2024    CREATININE 0.69 06/28/2024    BCR 10.1 06/28/2024    K 4.2 06/28/2024    CO2 26.4 06/28/2024    CALCIUM 9.9 06/28/2024    ALBUMIN 4.6 06/28/2024    AST 22 06/28/2024    ALT 16 06/28/2024     Lab Results   Component Value Date    WBC 7.77 06/28/2024    HGB 15.3  "06/28/2024    HCT 45.9 06/28/2024    MCV 88.3 06/28/2024     06/28/2024     Lab Results   Component Value Date    CHOL 200 06/28/2024    TRIG 102 06/28/2024    HDL 67 (H) 06/28/2024     (H) 06/28/2024     Lab Results   Component Value Date    TSH 3.320 04/17/2024     No results found for: \"HGBA1C\"  Lab Results   Component Value Date    ALT 16 06/28/2024     No results found for: \"HGBA1C\"  Lab Results   Component Value Date    CREATININE 0.69 06/28/2024     No results found for: \"IRON\", \"TIBC\", \"FERRITIN\"  No results found for: \"INR\", \"PROTIME\"        --------------------------------------------------------------------------------------------------------------------------    ASSESSMENT/PLAN:      Diagnosis Plan   1. Essential hypertension  losartan (Cozaar) 25 MG tablet      2. Atrial fibrillation, persistent  apixaban (ELIQUIS) 5 MG tablet tablet          Atrial fibrillation  Underwent successful cardioversion and is maintaining normal sinus rhythm.  Given [id number] or of 2 she will continue with Eliquis.  Continue to monitor for recurrent episodes    Hypertension  Blood pressure controlled.  Recent lab work stable.  Continue losartan 12.5 mg daily        This document has been @Electronically signed by JACKIE Short, 08/23/24, 9:25 AM EDT.       Dictated Utilizing Dragon Dictation: Part of this note may be an electronic transcription/translation of spoken language to printed text using the Dragon Dictation System.    Follow-up appointment and medication changes provided in hand delivered After Visit Summary as well as reviewed in the room.     "

## 2024-10-18 ENCOUNTER — HOSPITAL ENCOUNTER (OUTPATIENT)
Dept: CT IMAGING | Facility: HOSPITAL | Age: 59
Discharge: HOME | End: 2024-10-18
Admitting: INTERNAL MEDICINE
Payer: COMMERCIAL

## 2024-10-18 DIAGNOSIS — J44.9 CHRONIC OBSTRUCTIVE PULMONARY DISEASE, UNSPECIFIED COPD TYPE: ICD-10-CM

## 2024-10-18 LAB — CREAT BLDA-MCNC: 0.6 MG/DL (ref 0.6–1.3)

## 2024-10-18 PROCEDURE — 82565 ASSAY OF CREATININE: CPT

## 2024-10-18 PROCEDURE — 25510000001 IOPAMIDOL 61 % SOLUTION: Performed by: INTERNAL MEDICINE

## 2024-10-18 PROCEDURE — 71260 CT THORAX DX C+: CPT

## 2024-10-18 RX ORDER — IOPAMIDOL 612 MG/ML
100 INJECTION, SOLUTION INTRAVASCULAR
Status: COMPLETED | OUTPATIENT
Start: 2024-10-18 | End: 2024-10-18

## 2024-10-18 RX ADMIN — IOPAMIDOL 80 ML: 612 INJECTION, SOLUTION INTRAVENOUS at 09:46

## 2024-11-08 ENCOUNTER — TRANSCRIBE ORDERS (OUTPATIENT)
Dept: ADMINISTRATIVE | Facility: HOSPITAL | Age: 59
End: 2024-11-08
Payer: COMMERCIAL

## 2024-11-08 DIAGNOSIS — E03.9 HYPOTHYROIDISM, UNSPECIFIED TYPE: Primary | ICD-10-CM

## 2024-11-15 ENCOUNTER — HOSPITAL ENCOUNTER (OUTPATIENT)
Dept: ULTRASOUND IMAGING | Facility: HOSPITAL | Age: 59
Discharge: HOME | End: 2024-11-15
Admitting: NURSE PRACTITIONER
Payer: COMMERCIAL

## 2024-11-15 DIAGNOSIS — E03.9 HYPOTHYROIDISM, UNSPECIFIED TYPE: ICD-10-CM

## 2024-11-15 PROCEDURE — 76536 US EXAM OF HEAD AND NECK: CPT | Performed by: RADIOLOGY

## 2024-11-15 PROCEDURE — 76536 US EXAM OF HEAD AND NECK: CPT

## 2024-12-02 ENCOUNTER — OFFICE VISIT (OUTPATIENT)
Dept: PULMONOLOGY | Facility: CLINIC | Age: 59
End: 2024-12-02
Payer: COMMERCIAL

## 2024-12-02 VITALS
OXYGEN SATURATION: 100 % | WEIGHT: 166 LBS | HEART RATE: 88 BPM | DIASTOLIC BLOOD PRESSURE: 82 MMHG | SYSTOLIC BLOOD PRESSURE: 128 MMHG | TEMPERATURE: 96.9 F | BODY MASS INDEX: 27.66 KG/M2 | HEIGHT: 65 IN

## 2024-12-02 DIAGNOSIS — J44.9 CHRONIC OBSTRUCTIVE PULMONARY DISEASE, UNSPECIFIED COPD TYPE: Primary | ICD-10-CM

## 2024-12-02 DIAGNOSIS — Z87.891 EX-SMOKER: ICD-10-CM

## 2024-12-02 PROCEDURE — 99214 OFFICE O/P EST MOD 30 MIN: CPT | Performed by: INTERNAL MEDICINE

## 2024-12-02 RX ORDER — ALBUTEROL SULFATE 90 UG/1
2 INHALANT RESPIRATORY (INHALATION) EVERY 4 HOURS PRN
Qty: 1 G | Refills: 5 | Status: SHIPPED | OUTPATIENT
Start: 2024-12-02

## 2024-12-02 RX ORDER — TIOTROPIUM BROMIDE INHALATION SPRAY 3.12 UG/1
2 SPRAY, METERED RESPIRATORY (INHALATION)
Qty: 1 EACH | Refills: 5 | Status: SHIPPED | OUTPATIENT
Start: 2024-12-02

## 2024-12-02 NOTE — PROGRESS NOTES
Chief Complaint  Chronic obstructive pulmonary disease, unspecified COPD type    Iqra López is a 59 y.o. female who presents today to [facility] for Chronic obstructive pulmonary disease, unspecified COPD type.    HPI:         Patient is a 59-year-old female with more than 40 pack years of cigarette smoking, quit quit smoking earlier this year.    Also has Gold stage III class a COPD.       She was hospitalized earlier this year due to multifocal nodular pneumonia.  She had a follow-up CAT scan done in 2024 which showed resolution of pneumonia.  2 mm groundglass haziness in left upper lobe.    Previous workup: Sleep study showed AHI of 4 which felt short of the definition of obstructive sleep apnea.     PFTs done 2024.  It showed FEV1 FvC ratio of 55, postbronchodilator FEV1 40%, 1.02 L.  Diffusion capacity 62%.  Unclear reason total lung capacity and lung volumes were not done.     Patient reported breathing much better since she was started on Spiriva Respimat 2 puffs every morning.  No significant use of rescue inhaler.  She denies nocturnal cough and wheezing.     Usually gets refreshing sleep.        She denies fever, night sweats, unintentional weight loss, rash, joint stiffness.     She does have history of arrhythmias but recently has not had any palpitation or lightheadedness.  Previous History:   Past Medical History:   Diagnosis Date    Hypertension     Pneumonia     Thyroid disease       Past Surgical History:   Procedure Laterality Date    HYSTERECTOMY      TUBAL ABDOMINAL LIGATION        Social History     Socioeconomic History    Marital status:    Tobacco Use    Smoking status: Former     Current packs/day: 0.00     Average packs/day: 1 pack/day for 50.0 years (50.0 ttl pk-yrs)     Types: Cigarettes     Quit date: 2024     Years since quittin.9     Passive exposure: Past    Smokeless tobacco: Never    Tobacco  "comments:     Quit 01/01/2024   Vaping Use    Vaping status: Every Day    Substances: Nicotine, Flavoring    Devices: Disposable   Substance and Sexual Activity    Alcohol use: Never    Drug use: Never    Sexual activity: Not Currently     Partners: Male     Birth control/protection: Birth control pill, Hysterectomy, Partner of same sex        Current Medications:  Current Outpatient Medications   Medication Sig Dispense Refill    albuterol sulfate  (90 Base) MCG/ACT inhaler Inhale 2 puffs Every 4 (Four) Hours As Needed for Wheezing or Shortness of Air. 1 g 5    apixaban (ELIQUIS) 5 MG tablet tablet Take 1 tablet by mouth 2 (Two) Times a Day. 180 tablet 1    hydroCHLOROthiazide 25 MG tablet Take 1 tablet by mouth Daily.      levothyroxine (SYNTHROID, LEVOTHROID) 100 MCG tablet Take 1 tablet by mouth Every Morning.      losartan (Cozaar) 25 MG tablet Take 0.5 tablets by mouth Daily. 60 tablet 1    tiotropium bromide monohydrate (Spiriva Respimat) 2.5 MCG/ACT aerosol solution inhaler Inhale 2 puffs Daily. 1 each 5     No current facility-administered medications for this visit.       Allergies:   Allergies   Allergen Reactions    Bactrim [Sulfamethoxazole-Trimethoprim] Hives    Clindamycin/Lincomycin Hives    Penicillins Hives       Vitals:   /82   Pulse 88   Temp 96.9 °F (36.1 °C)   Ht 165.1 cm (65\")   Wt 75.3 kg (166 lb)   SpO2 100%   BMI 27.62 kg/m²     Estimated body mass index is 27.62 kg/m² as calculated from the following:    Height as of this encounter: 165.1 cm (65\").    Weight as of this encounter: 75.3 kg (166 lb).    Iqra López  reports that she quit smoking about 11 months ago. Her smoking use included cigarettes. She has a 50 pack-year smoking history. She has been exposed to tobacco smoke. She has never used smokeless tobacco.            Physical Exam:   Physical Exam  Vitals reviewed.   Constitutional:       Appearance: Normal appearance.      Interventions: She is not " "intubated.  HENT:      Head: Normocephalic.      Nose: Nose normal.      Mouth/Throat:      Mouth: Mucous membranes are moist.   Eyes:      Extraocular Movements: Extraocular movements intact.      Conjunctiva/sclera: Conjunctivae normal.      Pupils: Pupils are equal, round, and reactive to light.   Cardiovascular:      Rate and Rhythm: Normal rate.      Heart sounds: No murmur heard.     No friction rub. No gallop.   Pulmonary:      Effort: Pulmonary effort is normal. No tachypnea, bradypnea, accessory muscle usage, prolonged expiration, respiratory distress or retractions. She is not intubated.      Breath sounds: Normal breath sounds. No stridor, decreased air movement or transmitted upper airway sounds. No wheezing or rales.   Abdominal:      General: There is no distension.      Palpations: Abdomen is soft. There is no mass.      Tenderness: There is no abdominal tenderness. There is no right CVA tenderness, left CVA tenderness, guarding or rebound.      Hernia: No hernia is present.   Skin:     Coloration: Skin is not jaundiced.      Findings: No erythema.   Neurological:      General: No focal deficit present.      Mental Status: She is alert and oriented to person, place, and time.   Psychiatric:         Mood and Affect: Mood normal.          Procedures     STOP-Bang Score:     [place] Sleepiness Scale:       Lab Results:   Hospital Outpatient Visit on 10/18/2024   Component Date Value Ref Range Status    Creatinine 10/18/2024 0.60  0.60 - 1.30 mg/dL Final    Serial Number: [id number]:  [id number]       Lab Results (last 72 hours)       ** No results found for the last 72 hours. **          WBC No results found for: \"WBC\"   HGB No results found for: \"HGB\"   HCT No results found for: \"HCT\"   Platlets No results found for: \"LABPLAT\"     PT/INR:  No results found for: \"PROTIME\"/No results found for: \"INR\"    Sodium No results found for: \"NA\"   Potassium No results found for: \"K\"   Chloride No results found " "for: \"CL\"   Bicarbonate No results found for: \"PLASMABICARB\"   BUN No results found for: \"BUN\"   Creatinine No results found for: \"CREATININE\"   Calcium No results found for: \"CALCIUM\"   Magnesium No results found for: \"MG\"     pH No results found for: \"PHART\"   pO2 No results found for: \"PO2ART\"   pCO2 No results found for: \"MRG4MCY\"   HCO3 No results found for: \"IEY4XOR\"           Radiology Review:   Results for orders placed during the hospital encounter of 04/17/24    XR Chest 1 View    Narrative  PROCEDURE: XR CHEST 1 VW-    HISTORY: Chest Pain Protocol    COMPARISON: None.    FINDINGS: The heart is normal in size. The mediastinum is unremarkable.  There is blunting of the left costophrenic angle likely a small  effusion. There is left basilar atelectasis. The right lung is clear.  There is no pneumothorax. There are no acute osseous abnormalities.    Impression  Small left effusion and basilar atelectasis.      This report was finalized on 4/17/2024 1:13 PM by Marques Echeverria M.D..     No results found for this or any previous visit.    No results found for this or any previous visit.    No results found for this or any previous visit.    Results for orders placed during the hospital encounter of 10/18/24    CT Chest With Contrast Diagnostic    Narrative  EXAM:  CT Chest With Intravenous Contrast    EXAM DATE:  10/18/2024 9:32 AM    CLINICAL HISTORY:  heavy smoking . h/o nodular/ multifocal pna in april; J44.9-Chronic  obstructive pulmonary disease, unspecified    TECHNIQUE:  Axial computed tomography images of the chest with intravenous  contrast.  Sagittal and coronal reformatted images were created and  reviewed.  This CT exam was performed using one or more of the following  dose reduction techniques:  automated exposure control, adjustment of  the mA and/or kV according to patient size, and/or use of iterative  reconstruction technique.    COMPARISON:  4/17/2024    FINDINGS:  Limitations:  Please note " that reported measurements are made  manually, as computer generated (AI) measurements can over measure  lesions. Additionally, lesions identified by AI may have be present  presently, significant nodules will be discussed in the report and the  visual depictions of lesions provided by AI are for general reference  only.  Lungs and pleural spaces:  Interval resolution pneumonia. No new  airspace disease identified.  Benign-appearing groundglass nodule left  lung is 2.9 mm and is stable.  No significant effusion.  Heart:  Unremarkable as visualized.  No cardiomegaly.  No significant  pericardial effusion.  No significant coronary artery calcifications.  Bones/joints:  Unremarkable as visualized.  No acute fracture.  No  dislocation.  Soft tissues:  Unremarkable as visualized.  Vasculature:  Unremarkable as visualized.  No thoracic aortic  aneurysm.  Lymph nodes:  Unremarkable as visualized.  No enlarged lymph nodes.    Impression  Interval resolution pneumonia. No new airspace disease identified. No  acute thoracic findings identified.      This report was finalized on 10/18/2024 10:37 AM by Dr. Miguel Stokes MD.    No results found for this or any previous visit.     No results found for this or any previous visit.    No results found for this or any previous visit.    No results found for this or any previous visit.                  Results Review: I reviewed the patient's new clinical results.    Assessment and Plan    Visit Diagnosis:     ICD-10-CM ICD-9-CM   1. Chronic obstructive pulmonary disease, unspecified COPD type  J44.9 496   2. Ex-smoker  Z87.891 V15.82   Stable on present regimen of Spiriva Respimat 2.5 mcg 2 puff daily.  Ventolin 2 puff every 4 hours as needed.    She qualifies for yearly low-dose CAT scan of the chest.  Next 1 is due in October 2025.  I would also like to repeat a PFTs in July which will be 1 year from the last PFTs.    Return to clinic in 6 months, early as needed    New  Medications:   New Medications Ordered This Visit   Medications    tiotropium bromide monohydrate (Spiriva Respimat) 2.5 MCG/ACT aerosol solution inhaler     Sig: Inhale 2 puffs Daily.     Dispense:  1 each     Refill:  5    albuterol sulfate  (90 Base) MCG/ACT inhaler     Sig: Inhale 2 puffs Every 4 (Four) Hours As Needed for Wheezing or Shortness of Air.     Dispense:  1 g     Refill:  5       Discontinued Medications:   Medications Discontinued During This Encounter   Medication Reason    albuterol sulfate  (90 Base) MCG/ACT inhaler Reorder    tiotropium bromide monohydrate (Spiriva Respimat) 2.5 MCG/ACT aerosol solution inhaler Reorder            Follow Up:       Patient was given instructions and counseling regarding her condition. Please see specific information pulled into the AVS if appropriate.       This document has been electronically signed by Og Carrillo MD   December 2, 2024 15:16 EST    Dictated Utilizing Dragon Dictation: Part of this note may be an electronic transcription/translation of spoken language to printed text using the Dragon Dictation System.

## 2025-03-07 ENCOUNTER — OFFICE VISIT (OUTPATIENT)
Dept: CARDIOLOGY | Facility: CLINIC | Age: 60
End: 2025-03-07
Payer: COMMERCIAL

## 2025-03-07 ENCOUNTER — TELEPHONE (OUTPATIENT)
Dept: CARDIOLOGY | Facility: CLINIC | Age: 60
End: 2025-03-07

## 2025-03-07 VITALS
HEART RATE: 70 BPM | RESPIRATION RATE: 16 BRPM | WEIGHT: 158 LBS | BODY MASS INDEX: 26.33 KG/M2 | HEIGHT: 65 IN | DIASTOLIC BLOOD PRESSURE: 63 MMHG | SYSTOLIC BLOOD PRESSURE: 108 MMHG | OXYGEN SATURATION: 96 %

## 2025-03-07 DIAGNOSIS — I48.19 ATRIAL FIBRILLATION, PERSISTENT: ICD-10-CM

## 2025-03-07 DIAGNOSIS — I10 ESSENTIAL HYPERTENSION: ICD-10-CM

## 2025-03-07 DIAGNOSIS — E78.5 DYSLIPIDEMIA: Primary | ICD-10-CM

## 2025-03-07 PROCEDURE — 99214 OFFICE O/P EST MOD 30 MIN: CPT | Performed by: NURSE PRACTITIONER

## 2025-03-07 RX ORDER — LOSARTAN POTASSIUM 25 MG/1
12.5 TABLET ORAL DAILY
Qty: 60 TABLET | Refills: 1 | Status: SHIPPED | OUTPATIENT
Start: 2025-03-07

## 2025-03-07 RX ORDER — POTASSIUM CHLORIDE 750 MG/1
10 TABLET, EXTENDED RELEASE ORAL DAILY
COMMUNITY

## 2025-03-07 NOTE — TELEPHONE ENCOUNTER
----- Message from Linette Phelan sent at 3/7/2025 10:02 AM EST -----  Please request labs from PCP

## 2025-03-07 NOTE — PROGRESS NOTES
[facility]             LinetteJACKIE Hardy Tammie L, APRN  Iqra López  1965 03/07/2025    Patient Active Problem List   Diagnosis    Atrial fibrillation, persistent    Precordial chest pain    Shortness of breath    Essential hypertension    Palpitations    Cigarette nicotine dependence in remission    Atrial flutter with controlled response    Dyslipidemia       Dear Kitty Kidd APRN:    Subjective     Chief Complaint   Patient presents with    Follow-up     7 MOS    Med Management     verbal       HPI:     This is a 60 y.o. female with known past medical history of persistent atrial fibrillation, essential hypertension and dyslipidemia.     Iqra López presents today for routine cardiology follow up.   History of Present Illness    She reports no new symptoms or concerns since her last visit, with no hospitalizations in the interim. She has been managing well post-cardioversion, with no known recurrence of atrial fibrillation. She is tolerating Eliquis without any adverse effects and does not report any financial difficulties in obtaining the medication.     Her primary care physician has been monitoring her potassium levels every 3 months due to persistent hypokalemia. She has been on hydrochlorothiazide for several years without any issues, but recently experienced leg swelling upon discontinuation of the medication. She also reports that her systolic blood pressure tends to increase without the use of hydrochlorothiazide. She has been making dietary modifications, including reducing potato intake, in an effort to lose weight. She does not recall the exact value of her potassium level but notes that it was within the normal range during her last lab visit while on potassium supplements.    MEDICATIONS  Eliquis, hydrochlorothiazide, losartan.     Diagnostic Testing  Echocardiogram 6/2024: EF 61 to 65%  Nuclear stress test  7/2024:  Small fixed mid anterior, apical, apical inferior extending to mid inferior wall, the anterior, apical walls defect with normal wall motion most likely represent breast attenuation artifact, with possible mid to distal inferior infarction, with possible slight herminia-infarction inferior ischemia, TID 1.14.  Successful cardioversion 8/2024     All other systems were reviewed and were negative.    Patient Active Problem List   Diagnosis    Atrial fibrillation, persistent    Precordial chest pain    Shortness of breath    Essential hypertension    Palpitations    Cigarette nicotine dependence in remission    Atrial flutter with controlled response    Dyslipidemia       family history includes Cancer in her father; Diabetes in her maternal uncle and maternal uncle; Heart attack in her mother; Heart failure in her maternal aunt; Hypertension in her father; Lung disease in her maternal aunt.     reports that she quit smoking about 14 months ago. Her smoking use included cigarettes. She has a 50 pack-year smoking history. She has been exposed to tobacco smoke. She has never used smokeless tobacco. She reports that she does not drink alcohol and does not use drugs.    Allergies   Allergen Reactions    Bactrim [Sulfamethoxazole-Trimethoprim] Hives    Clindamycin/Lincomycin Hives    Penicillins Hives         Current Outpatient Medications:     albuterol sulfate  (90 Base) MCG/ACT inhaler, Inhale 2 puffs Every 4 (Four) Hours As Needed for Wheezing or Shortness of Air., Disp: 1 g, Rfl: 5    apixaban (ELIQUIS) 5 MG tablet tablet, Take 1 tablet by mouth 2 (Two) Times a Day., Disp: 180 tablet, Rfl: 1    hydroCHLOROthiazide 25 MG tablet, Take 1 tablet by mouth Daily., Disp: , Rfl:     levothyroxine (SYNTHROID, LEVOTHROID) 100 MCG tablet, Take 1 tablet by mouth Every Morning., Disp: , Rfl:     losartan (Cozaar) 25 MG tablet, Take 0.5 tablets by mouth Daily., Disp: 60 tablet, Rfl: 1    potassium chloride (KLOR-CON M10)  10 MEQ CR tablet, Take 1 tablet by mouth Daily. with food., Disp: , Rfl:     tiotropium bromide monohydrate (Spiriva Respimat) 2.5 MCG/ACT aerosol solution inhaler, Inhale 2 puffs Daily., Disp: 1 each, Rfl: 5      Physical Exam:  I have reviewed the patient's current vital signs as documented in the patient's EMR.   Vitals:    03/07/25 0944   BP: 108/63   Pulse: 70   Resp: 16   SpO2: 96%     Body mass index is 26.29 kg/m².       03/07/25  0944   Weight: 71.7 kg (158 lb)      Physical Exam  Constitutional:       General: She is not in acute distress.     Appearance: Normal appearance. She is well-developed and normal weight.   HENT:      Head: Normocephalic and atraumatic.   Eyes:      General: Lids are normal.      Conjunctiva/sclera: Conjunctivae normal.      Pupils: Pupils are equal, round, and reactive to light.   Neck:      Vascular: No carotid bruit or JVD.   Cardiovascular:      Rate and Rhythm: Normal rate and regular rhythm.      Pulses: Normal pulses.      Heart sounds: Normal heart sounds, S1 normal and S2 normal. No murmur heard.  Pulmonary:      Effort: Pulmonary effort is normal. No respiratory distress.      Breath sounds: Normal breath sounds. No wheezing.   Abdominal:      General: Bowel sounds are normal. There is no distension.      Palpations: Abdomen is soft. There is no hepatomegaly or splenomegaly.      Tenderness: There is no abdominal tenderness.   Musculoskeletal:         General: No swelling. Normal range of motion.      Cervical back: Normal range of motion and neck supple.      Right lower leg: No edema.      Left lower leg: No edema.   Skin:     General: Skin is warm and dry.      Coloration: Skin is not jaundiced.      Findings: No rash.   Neurological:      General: No focal deficit present.      Mental Status: She is alert and oriented to person, place, and time. Mental status is at baseline.   Psychiatric:         Mood and Affect: Mood normal.         Speech: Speech normal.          "Behavior: Behavior normal.         Thought Content: Thought content normal.         Judgment: Judgment normal.          DATA REVIEWED:     TTE/GUIDO:  Results for orders placed during the hospital encounter of 06/28/24    Adult Transthoracic Echo Complete w/ Color, Spectral and Contrast if necessary per protocol    Interpretation Summary    Left ventricular systolic function is normal. Left ventricular ejection fraction appears to be 61 - 65%.    Left ventricular wall thickness is consistent with mild concentric hypertrophy.    Left ventricular diastolic function was normal.      Laboratory evaluations:    Lab Results   Component Value Date    GLUCOSE 91 06/28/2024    BUN 7 (L) 06/28/2024    CREATININE 0.60 10/18/2024    BCR 10.1 06/28/2024    K 4.2 06/28/2024    CO2 26.4 06/28/2024    CALCIUM 9.9 06/28/2024    ALBUMIN 4.6 06/28/2024    AST 22 06/28/2024    ALT 16 06/28/2024     Lab Results   Component Value Date    WBC 7.77 06/28/2024    HGB 15.3 06/28/2024    HCT 45.9 06/28/2024    MCV 88.3 06/28/2024     06/28/2024     Lab Results   Component Value Date    CHOL 200 06/28/2024    TRIG 102 06/28/2024    HDL 67 (H) 06/28/2024     (H) 06/28/2024     Lab Results   Component Value Date    TSH 3.320 04/17/2024     No results found for: \"HGBA1C\"  Lab Results   Component Value Date    ALT 16 06/28/2024     No results found for: \"HGBA1C\"  Lab Results   Component Value Date    CREATININE 0.60 10/18/2024     No results found for: \"IRON\", \"TIBC\", \"FERRITIN\"  No results found for: \"INR\", \"PROTIME\"   No components found for: \"ABSOLUTELUNG\"    --------------------------------------------------------------------------------------------------------------------------    ASSESSMENT/PLAN:      Diagnosis Plan   1. Dyslipidemia        2. Atrial fibrillation, persistent  apixaban (ELIQUIS) 5 MG tablet tablet      3. Essential hypertension  losartan (Cozaar) 25 MG tablet        Assessment & Plan  1. Atrial fibrillation.  She " has been doing well since her cardioversion with no recurrence of atrial fibrillation. She is currently on Eliquis 5 mg twice a day.Maintaining normal sinus rhythm.     2. Hypokalemia.  Her potassium levels have been low, likely due to hydrochlorothiazide 25 mg daily. She experiences leg swelling when not taking hydrochlorothiazide. Spironolactone was discussed as an alternative to hydrochlorothiazide, which could help manage her blood pressure and swelling while increasing her potassium levels. She is advised to discuss this option with her primary care physician, who is monitoring her potassium levels every 3 months.    3. Hypertension.  Her blood pressure is well-controlled today at 108/63 mmHg. She is currently on losartan 25 mg daily.  Labs requested from PCP.      This document has been @Electronically signed by JACKIE Short, 03/07/25, 8:50 AM EST.       Dictated Utilizing Dragon Dictation: Part of this note may be an electronic transcription/translation of spoken language to printed text using the Dragon Dictation System.    Patient or patient representative verbalized consent for the use of Ambient Listening during the visit with  JACKIE Short for chart documentation. 3/7/2025  09:17 EST    Follow-up appointment and medication changes provided in hand delivered After Visit Summary as well as reviewed in the room.